# Patient Record
Sex: FEMALE | Race: BLACK OR AFRICAN AMERICAN | NOT HISPANIC OR LATINO | Employment: PART TIME | URBAN - METROPOLITAN AREA
[De-identification: names, ages, dates, MRNs, and addresses within clinical notes are randomized per-mention and may not be internally consistent; named-entity substitution may affect disease eponyms.]

---
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
## 2021-01-22 ENCOUNTER — HISTORICAL (OUTPATIENT)
Dept: RADIOLOGY | Facility: HOSPITAL | Age: 21
End: 2021-01-22

## 2021-01-26 ENCOUNTER — HISTORICAL (OUTPATIENT)
Dept: FAMILY MEDICINE | Facility: CLINIC | Age: 21
End: 2021-01-26

## 2021-01-26 LAB
CORTIS SERPL-SCNC: <1 UG/DL
PROLACTIN LEVEL (OHS): 11.67 NG/ML (ref 5.18–26.53)
T4 FREE SERPL-MCNC: 0.6 NG/DL (ref 0.7–1.48)
TSH SERPL-ACNC: 1.65 UIU/ML (ref 0.35–4.94)

## 2021-05-18 LAB — POC BETA-HCG (QUAL): NEGATIVE

## 2021-06-07 ENCOUNTER — HISTORICAL (OUTPATIENT)
Dept: RADIOLOGY | Facility: HOSPITAL | Age: 21
End: 2021-06-07

## 2021-08-02 ENCOUNTER — HISTORICAL (OUTPATIENT)
Dept: ADMINISTRATIVE | Facility: HOSPITAL | Age: 21
End: 2021-08-02

## 2021-08-02 LAB
PROLACTIN LEVEL (OHS): 7.33 NG/ML (ref 5.18–26.53)
T4 FREE SERPL-MCNC: 0.86 NG/DL (ref 0.7–1.48)
TSH SERPL-ACNC: 0.07 UIU/ML (ref 0.35–4.94)

## 2022-03-28 ENCOUNTER — HISTORICAL (OUTPATIENT)
Dept: ADMINISTRATIVE | Facility: HOSPITAL | Age: 22
End: 2022-03-28

## 2022-03-28 ENCOUNTER — HISTORICAL (OUTPATIENT)
Dept: RADIOLOGY | Facility: HOSPITAL | Age: 22
End: 2022-03-28

## 2022-05-02 NOTE — HISTORICAL [facility] CERNER
This is a historical note converted from Elo. Formatting and pictures may have been removed.  Please reference Elo for original formatting and attached multimedia. Chief Complaint  Follow up Thyroid  History of Present Illness  20yo F presents to the [facility] for a routine visit.  ?  Acute Issues: Patient complaining of decreased appetite. Has lost 6lbs since 07/12/2021. Some times does not eat the whole day. Has been going on for about a week. Denies any nausea, vomiting, constipation, headaches, heat/cold intolerance or palpitations. Mother says she will eat if her mother cooks food that she likes.  ?  Chronic Conditions:  Hypopituitarism/Hx of Prolactinoma: s/p prolactinoma resection (01/2020)?Has been taking medication, but was taking incorrectly. Mother has begun helping her take the meds on time. Follows with Endocrinology and neurosurgery in [place], has appointment with Endocrinology at the beginning of September.  Primary Amenorrhea: managed by GYN clinic. compliant with OCP. has been advised that she may need to seek [facility] physician in future for possible fertility planning.  ?  Health Maintenance:  Immunizations: up to date  Review of Systems  Constitutional:?decreased appetite, + unintentional weight loss  HEENT: no headaches, no vision changes  Resp: No SOB, no cough, no difficulty breathing  CVS: No Chest pain, No palpitations, no edema  GI: No nausea, no vomiting, no constipation, no abdominal pain  ENT: no heat intolerance  Neuro: No syncope, no dizziness, no paraesthesias  Psych: No mood changes  Physical Exam  Vitals & Measurements  T:?37.3? ?C (Oral)? HR:?91(Peripheral)? RR:?19? BP:?107/71? SpO2:?100%?  HT:?168.00?cm? WT:?79.500?kg? BMI:?28.17?  General: Pleasant, comfortably sitting on exam table, non depressed mood, no acute distress  NECK: no LAD, no thyromegaly, no thyroid nodules palpated  CV: Regular rate rhythm,?no edema, 2+ peripheral pulses  Resp: Non-labored breathing, symmetrical  chest expansion bilaterally  Abd: soft, non-distended, non-tender to palpation, +BS  Assessment/Plan  1.?Decreased appetite?R63.0  -Possibly due to medications/hormone imbalance  -Will check TSH, Free T4, prolactin levels  Ordered:  Clinic Follow up, *Est. 09/17/21 14:40:00 CDT, Order for future visit, Primary amenorrhea, [facility]  ?  2.?Prolactinoma?D35.2  -Will check prolactin levels  -Keep appt with endocrinologist  -Continue with hydrocortisone and desmopressin as prescribed, refilled today  Ordered:  Clinic Follow up, *Est. 09/17/21 14:40:00 CDT, Order for future visit, Primary amenorrhea, [facility]  ?  3.?Hypothyroid?E03.9  -Continue Levothyroxine at current dose, refilled today  -Will consider medication adjustment following lab results and endocrinology follow up  Ordered:  Clinic Follow up, *Est. 09/17/21 14:40:00 CDT, Order for future visit, Primary amenorrhea, [facility]  ?  4.?Primary amenorrhea?N91.0  -Continue with OCPs  -Will provide info on [facility] specialist who take her insurance for future fertility talks  Ordered:  Clinic Follow up, *Est. 09/17/21 14:40:00 CDT, Order for future visit, Primary amenorrhea, [facility]  ?  Referrals  Clinic Follow up, *Est. 09/17/21 14:40:00 CDT, Order for future visit, Primary amenorrhea, [facility]   Problem List/Past Medical History  Ongoing  Hypothyroid  Primary amenorrhea  Prolactinoma  Historical  Morbid obesity  Procedure/Surgical History  Pituitary tumor  T&A/ PT TUBES   Medications  Colace 100 mg oral capsule, 100 mg= 1 cap(s), Oral, BID, PRN  desmopressin 0.1 mg oral tablet, 0.1 mg= 1 tab(s), Oral, BID  hydrocortisone 5 mg oral tablet, 15 mg= 3 tab(s), Oral, Daily  levothyroxine 125 mcg (0.125 mg) oral tablet, 125 mcg= 1 tab(s), Oral, qAM, 2 refills  Sprintec 0.25 mg-35 mcg oral tablet, 1 tab(s), Oral, Daily, 11 refills  Immunizations  Vaccine Date Status   meningococcal group B vaccine 12/16/2019 Given    hepatitis A pediatric vaccine 12/16/2019 Given   influenza virus vaccine, inactivated 11/15/2019 Recorded   influenza virus vaccine, inactivated 01/03/2019 Recorded   hepatitis A pediatric vaccine 06/07/2018 Given   influenza virus vaccine, inactivated 10/02/2017 Recorded   meningococcal conjugate vaccine 02/21/2017 Recorded   influenza virus vaccine, inactivated 10/20/2016 Recorded   influenza virus vaccine, live, trivalent 12/01/2015 Recorded   influenza virus vaccine, live, trivalent 10/10/2013 Recorded   tetanus/diphtheria/pertussis, acel(Tdap) 02/28/2012 Recorded   meningococcal conjugate vaccine 02/28/2012 Recorded   influenza virus vaccine, inactivated 01/20/2011 Recorded   varicella virus vaccine 07/23/2009 Recorded   influenza virus vaccine, inactivated 10/16/2006 Recorded   measles/mumps/rubella virus vaccine 03/21/2005 Recorded   diphtheria/pertussis, acel/tetanus ped 03/21/2005 Recorded   diphtheria/pertussis, acel/tetanus ped 10/31/2002 Recorded   varicella virus vaccine 04/29/2002 Recorded   measles/mumps/rubella virus vaccine 01/28/2002 Recorded   haemophilus b-hepatitis B vaccine 01/28/2002 Recorded   influenza virus vaccine, inactivated 2001 Recorded   poliovirus vaccine, inactivated 2001 Recorded   diphtheria/pertussis, acel/tetanus ped 2001 Recorded   pneumococcal 7-valent vaccine 2001 Recorded   hepatitis B pediatric vaccine 2001 Recorded   pneumococcal 7-valent vaccine 2001 Recorded   hepatitis B pediatric vaccine 2001 Recorded       Discussed with resident at time of visit.  Hx. of resected prolactinoma.  Mild anorexia.  Follow-up ordered studies.?  ?

## 2022-05-02 NOTE — HISTORICAL [facility] CERNER
This is a historical note converted from Cerodessa. Formatting and pictures may have been removed.  Please reference Elo for original formatting and attached multimedia. Chief Complaint  1 month F/U  History of Present Illness  Patient is a 20 yo G0 with hx of prolactinoma and hypothyroidism who presents for?follow up of her amenorrhea. She was started on OCPs 2/2019. Reports she had monthly menses until 5/2019 when endocrinologist discontinued OCPs due to concern for effect on her other medications. Her prolactin/thyroid control have been optimized and she has remained amenorrheic. Her workup was previously notable for undetectable estradiol level?with low normal LH and FSH levels. Pelvic US was also obtained and unremarkable. She is not yet sexually active and has no issues with pelvic pain.  ?   Of note, patients mother states she did not start periods until age 18 as did patients sister. She adds that her own mother did not start until age 19.  Review of Systems  Cardiovascular: Normal; Negative for irregular heartbeat, heart murmurs, palpitations.  Pulmonary: Normal; Negative for cough, sputum, shortness of breath, wheezing, asthma, or emphysema.  GI: Negative for pain, vomiting, heartburn, peptic ulcer disease, change in stool.  : Negative except as stated in HPI  Skin: Normal; Negative for rashes, keratoses, skin cancers, or acne.  MSK: Normal; Negative for joint pain, joint swelling, arthritis, joint deformity, problems with ambulation, or injuries.  Neuro: Normal; Negative for seizures, stroke, AMS or dizziness.  Endocrine: +hypothyroidism, +prolactinoma.  Vascular: Normal; Negative for varicose veins, blood clots, atherosclerosis, or leg ulcers.  Physical Exam  Vitals & Measurements  T:?37.2? ?C (Oral)? HR:?87(Peripheral)? RR:?20? BP:?99/61? SpO2:?100%?  HT:?170?cm? WT:?58.6?kg? BMI:?20.28?  General: NAD, A/Ox3. Well appearing.  Respiratory: CTAB, no wheezes, rales, or rhonchi  Cardiovascular: RRR no  murmurs, rubs, or gallops  Abdomen: soft, nondistended, nontender to palpation.  Extremities: no edema, no calf tenderness  Assessment/Plan  1.?Amenorrhea?N91.2  - Recommended discussing resuming OCPs with endocrinologist.  - Discussed potential benefit for establishing care with reproductive endocrinologist. Discussed concern for hypothalamic hypogonadism and potential implications for reproductive goals with patient and her mother.  - Pelvic US without abnormalities 5/2018  - Repeat FSH/LH/Estradiol today  - Counseled regarding importance of balanced diet and regular weight bearing exercise.?Recommended womens multivitamin.  ?  RTC 6 months  Ordered:  Clinic Follow up, *Est. 02/26/20 3:00:00 CST, Order for future visit, Amenorrhea, [facility]  Estradiol-[facility] [id number], Routine collect, 08/26/19 10:12:00 CDT, Blood, Order for future visit, Stop date 08/26/19 10:12:00 CDT, Lab Collect, Amenorrhea, 08/26/19 10:12:00 CDT  Follicle Stimulating Hormone Level, Routine collect, 08/26/19 10:10:00 CDT, Blood, Order for future visit, Stop date 08/26/19 10:10:00 CDT, Lab Collect, Amenorrhea, 08/26/19 10:10:00 CDT  Luteinizing Hormone Level, Routine collect, 08/26/19 10:10:00 CDT, Blood, Order for future visit, Stop date 08/26/19 10:10:00 CDT, Lab Collect, Amenorrhea, 08/26/19 10:10:00 CDT  ?   Problem List/Past Medical History  Ongoing  Hypothyroid  Primary amenorrhea  Prolactinoma  Historical  Morbid obesity  Procedure/Surgical History  T&A/ PT TUBES   Medications  BROMOCRIPTIN TAB 2.5MG, 2.5 mg= 1 tab(s), Oral, Daily  ibuprofen 400 mg oral tablet, 400 mg= 1 tab(s), Oral, q6hr, PRN  LEVOTHYROXIN TAB 75MCG, 75 mcg= 1 tab(s), Oral, Daily  Sprintec 0.25 mg-35 mcg oral tablet, 1 tab(s), Oral, Daily, 11 refills  Allergies  No Known Allergies  Lab Results  Test Name Test Result Date/Time Comments   Prolactin 0.3 ng/mL (Low) 08/06/2018 14:53 CDT    T4 Free 1.08 ng/dL 06/18/2019 09:58 CDT    TSH 0.069 mIU/L (Low) 06/18/2019  09:58 CDT    Chlamydia trach-LC Negative 07/02/2019 09:30 CDT    N gonorrhoeae MARCELINO-LC Negative 07/02/2019 09:30 CDT Performed At: [facility]  [address], [place] [id number]  Jony Portillo MD Ph:[phone]   LH 0.4  FSH 1.9  Estradiol <.5  Diagnostic Results  (05/08/2018 11:53 CDT US Pelvic Non-Obsetrics)  FINDINGS: Sonographic evaluation of the pelvis was performed  transabdominally only. Imaging is limited by incomplete filling of the  urinary bladder providing an acoustic window.  ?  The uterus measures 3.8 x 2.0 x 3.0 cm. The endometrial stripe is  within normal limits of thickness measuring 5 mm. The left ovary  appears sonographically unremarkable with blood flow detected on color  flow Doppler imaging. The right ovary was not visualized and is likely  obscured by bowel gas. No free pelvic fluid or abnormal adnexal masses  are visible.  ?  IMPRESSION:  1. The uterus is small measuring 3.8 cm in length.  2. No free fluid or adnexal abnormalities are appreciated although the  right ovary was not visualized.  ?  Signature Line  Electronically Signed By: Quintin Salas MD.  Date/Time Signed: 05/08/2018 12:00 [1]     [1]?US Pelvic Non-Obsetrics; Quintin Salas MD. 05/08/2018 11:53 CDT   I asked for her endocrinologist to be sent this note for communication.? I suspect they stopped OCPs to determine if she is hypothal/hypopit. amenorrhea, as these can mask this issue.? Her FSH level was low in the past, and she still has yet to resume menses off of OCPs.?  At this point, I recommend that she see a Reproductive Endocrinologist or can continue with her Endocrinologist to manage IF this is hypothal/hypopituitarism.? [facility] was recommended as she will need fertility counseling at some point with a future plan for when she does desire childbearing.  We will remain her Gyn, however I strongly would advise we get to bottom of her condition to better  her.? OCPs will  potentially mask the underlying issue, and we can hold these until final diagnosis is made.

## 2022-05-02 NOTE — HISTORICAL [facility] CERNER
This is a historical note converted from Elo. Formatting and pictures may have been removed.  Please reference Elo for original formatting and attached multimedia. Chief Complaint  amenorrhea f/u 6 months  History of Present Illness  19 yo G0 history?of prolactinoma and hypothyroidism who presents for?follow up?primary?amenorrhea.  ?  She was started on OCPs 2/2019. Reports she had monthly menses until 5/2019 when endocrinologist discontinued OCPs due to concern for effect on her other medications.  Her workup was previously notable for undetectable estradiol level?with low normal LH and FSH levels. Pelvic US was also obtained 5/2018?and unremarkable. She is not yet sexually active and has no issues with pelvic pain.  She is now s/p transsphenoidal?hypophysectomy?this January.?She reports she remains amenorrheic. She denies pelvic pain, abnormal discharge.  Gynecological History  Last Menstrual Period: 05/15/19  Menstrual Status Intake: Premenarcheal  Of note, patients mother states she did not start periods until age 18 as did patients sister. She adds that her own mother did not start until age 19.  Not sexually active  Review of Systems  Cardiovascular: Normal; Negative for irregular heartbeat, heart murmurs, palpitations.  Pulmonary: Normal; Negative for cough, sputum, shortness of breath, wheezing, asthma, or emphysema.  GI: Negative for pain, vomiting, heartburn, peptic ulcer disease, change in stool.  : Negative except as stated in HPI  Skin: Normal; Negative for rashes, keratoses, skin cancers, or acne.  MSK: Normal; Negative for joint pain, joint swelling, arthritis, joint deformity, problems with ambulation, or injuries.  Neuro: Normal; Negative for seizures, stroke, AMS or dizziness.  Endocrine: +hypothyroidism, +prolactinoma.  Vascular: Normal; Negative for varicose veins, blood clots, atherosclerosis, or leg ulcers.  Physical Exam  Vitals & Measurements  T:?36.7? ?C (Oral)? HR:?62(Peripheral)?  RR:?18? BP:?100/66? SpO2:?100%?  HT:?169?cm? WT:?64.8?kg? BMI:?22.69?  General:?A&O, NAD, VSSAF  Eye: PERRLA, EOMI, clear conjunctiva, eyelids normal  Respiratory:?CTAB  Cardiovascular:?RRR  Gastrointestinal:?soft, non-tender, non-distended  Genitourinary: no CVA tenderness to palpation  Pelvic Exam:??Deferred  ?  Assessment/Plan  1.?Primary amenorrhea?N91.0  21 yo G0 with Primary amenorrhea secondary to hypothalamic hypogonadism with h/o hypothyroidism and ?prolactinoma s/p resection  ?  Previously?recommenced OCP for add back therapy which has been held for the past several months per her endocrinologist recommendation while on Bromocriptine which she is no longer taking. We discussed recommendation again for OCPs and her mother contacted Dr. Álvarez who she states is okay with her resuming OCP therapy. Rx provided.  ?  Recommended daily multivitamin with calcium and vitamin D as well as weight bearing exercise.  ?  Rediscussed concern for hypothalamic hypogonadism and potential implications for reproductive goals with patient and her mother. We discussed potential ramifications on her future fertility and recommendation to establish care with reproductive endocrinology when the time comes.  ?   Repeat FSH/LH/Estradiol today  Ordered:  Clinic Follow up, *Est. 05/10/20 3:00:00 CDT, Order for future visit, Primary amenorrhea  Estradiol [facility] [id number], Routine collect, 02/10/20 12:00:00 CST, Blood, Stop date 02/10/20 12:02:00 CST, Lab Collect, [facility], Primary amenorrhea, 02/10/20 12:00:00 CST  Follicle Stimulating Hormone Level, Routine collect, *Est. 02/10/20 3:00:00 CST, Blood, Order for future visit, *Est. Stop date 02/10/20 3:00:00 CST, Lab Collect, Primary amenorrhea, 02/10/20 11:57:00 CST  Luteinizing Hormone Level, Routine collect, 02/10/20 11:58:00 CST, Blood, Order for future visit, Stop date 02/10/20 11:58:00 CST, Lab Collect, Primary amenorrhea, 02/10/20 11:58:00  CST  ?  2.?Prolactinoma?D35.2  S/p?transsphenoidal?hypophysectomy? Neurosurgery @ [facility] in January. Records Requested.  She reports she has been doing well postop and her?bromocriptine therapy is discontinued.  Ordered:  Prolactin, Routine collect, 02/10/20 11:57:00 CST, Blood, Order for future visit, Stop date 02/10/20 11:57:00 CST, Lab Collect, Prolactinoma, 02/10/20 11:57:00 CST  ?  3.?Hypothyroid?E03.9  Currently on Synthroid 100 mcg Daily  ?  Follow up with Endocrinology scheduled 20.  ?  ?  RTC in 3 months  ?  Discussed with Dr. Nunez,  ?  Moncho Gonzalez MD PGY2?  [facility] [place] [facility]?  ?  Ordered:  Free T4, Routine collect, 02/10/20 11:58:00 CST, Blood, Order for future visit, Stop date 02/10/20 11:58:00 CST, Lab Collect, Hypothyroid, 02/10/20 11:58:00 CST  Thyroid Stimulating Hormone, Routine collect, *Est. 02/10/20 3:00:00 CST, Blood, Order for future visit, *Est. Stop date 02/10/20 3:00:00 CST, Lab Collect, Hypothyroid, 02/10/20 11:57:00 CST  ?  Orders:  ethinyl estradiol-norgestimate, 1 tab(s), Oral, Daily, # 28 tab(s), 11 Refill(s), Pharmacy: [id number], 169, cm, Height/Length Dosing, 02/10/20 11:08:00 CST, 64.8, kg, Weight Dosing, 02/10/20 11:08:00 CST   OB History  Pregnancy History???(0,0,0,0)?? ??  ?  ??  No previous pregnancies history have been recorded  Problem List/Past Medical History  Ongoing  Hypothyroid  Primary amenorrhea  Prolactinoma  Procedure/Surgical History  Pituitary tumor  T&A/ PT TUBES   Medications  acetaminophen-hydrocodone 325 mg-5 mg oral tablet, 1 tab(s), Oral, q8hr  desmopressin 0.1 mg oral tablet, 0.1 mg= 1 tab(s), Oral, BID  hydrocortisone 5 mg oral tablet, 15 mg= 3 tab(s), Oral, Daily  ibuprofen 400 mg oral tablet, 400 mg= 1 tab(s), Oral, q6hr, PRN,? ?Not taking  levothyroxine 100 mcg (0.1 mg) oral tablet, 100 mcg= 1 tab(s), Oral, Daily  Pantoprazole 40 mg ORAL EC-Tablet, 40 mg= 1 tab(s), Oral, Daily  Sprintec 0.25 mg-35 mcg oral  tablet, 1 tab(s), Oral, Daily, 11 refills  Allergies  No Known Allergies  Social History  Abuse/Neglect  No, 02/10/2020  Alcohol  Never, 02/10/2020  Employment/School  Student, 02/10/2020  Exercise  Exercise duration: 0., 02/10/2020  Home/Environment  Lives with Mother., 02/10/2020  Nutrition/Health  Regular, 02/10/2020  Sexual  Sexually active: No. Sexual orientation: Straight or heterosexual. Gender Identity Identifies as female. No, 02/10/2020  Spiritual/Cultural  Oriental orthodox, No, 12/16/2019  Substance Use  Never, 02/10/2020  Tobacco  Never (less than 100 in lifetime), N/A, 02/10/2020  Marital Status  Single  Lab Results  Test Name Test Result Date/Time Comments   FSH 0.5 mIU/mL 08/26/2019 10:52 CDT Follicle Stimulating Hormone reference range: Female, 11 years and older  ?  mIU/mL  Prepubertal Child 1.0 ? - 8.0  Follicular Phase 1.5 ? - 12.0  Mid Cycle 5.0 ? - 22.0  Luteal Phase 1.0 ? - 10.0  Postmenopausal 25.0 - 110.0   Prolactin 0.3 ng/mL (Low) 08/06/2018 14:53 CDT    LH <0.2 mIU/mL 08/26/2019 10:52 CDT Luteinizing Hormone reference range: Female, 11 years and older  ?  mIU/mL  Prepubertal Child 1.0 ? - 6.0  Follicular Phase 1.0 ? - 18.0  Mid Cycle 24.0 - 105.0  Luteal Phase 0.4 ? - 20.0  Postmenopausal 15.0 - 62.0  ?  Luteinizing Hormone reference range: Male  ?  mIU/mL  < = 2 years 0.5 ? - 1.9  2 - 1l years ? ? ? < 0.5  11 years and up 2.0 ? - 12.0   Estradiol-LC <5.0 pg/mL 08/26/2019 10:52 CDT ? ? ? ? ? ? ? ? ? ?Adult Female:  ? ? ? ? ? ? ? ? ? ? ?Follicular phase ? 12.5 - ? 166.0  ? ? ? ? ? ? ? ? ? ? ?Ovulation phase ? ?85.8 - ? 498.0  ? ? ? ? ? ? ? ? ? ? ?Luteal phase ? ? ? 43.8 - ? 211.0  ? ? ? ? ? ? ? ? ? ? ?Postmenopausal ? ? <6.0 - ? ?54.7  ? ? ? ? ? ? ? ? ? ?Pregnancy  ? ? ? ? ? ? ? ? ? ? ?1st trimester ? ? 215.0 - >4300.0  ? ? ? ? ? ? ? ? ? ?Girls (1-10 years) ? ?6.0 - ? ?27.0  [place] ECLIA methodology  Performed At: _[facility]  [address] [place] [id number]  Damon Pantoja MD  :[id number]   T4 Free 1.08 ng/dL 06/18/2019 09:58 CDT    TSH 0.069 mIU/L (Low) 06/18/2019 09:58 CDT    Diagnostic Results  - Pelvic US without abnormalities 5/2018      I kept this message/note for sometime due to labs and needing to follow up with patient.? I personally staffed this visit on 2/10/2020, date of visit.? She is HYPOTHALAMIC/HYPOPITUITARY failure and will need to be maintained on estrogen/progesterone to replace normal levels of these hormones.  I would like to ensure she understands this.  I have called her today (3/23/2020) via telephone and advised her to call us back at her convenience.  ?

## 2022-07-01 ENCOUNTER — HOSPITAL ENCOUNTER (EMERGENCY)
Facility: HOSPITAL | Age: 22
Discharge: HOME | End: 2022-07-01
Attending: FAMILY MEDICINE
Payer: MEDICAID

## 2022-07-01 VITALS
DIASTOLIC BLOOD PRESSURE: 60 MMHG | HEART RATE: 100 BPM | HEIGHT: 66 IN | OXYGEN SATURATION: 99 % | WEIGHT: 155.44 LBS | BODY MASS INDEX: 24.98 KG/M2 | SYSTOLIC BLOOD PRESSURE: 94 MMHG | RESPIRATION RATE: 16 BRPM | TEMPERATURE: 99 F

## 2022-07-01 DIAGNOSIS — J32.9 SINUSITIS, UNSPECIFIED CHRONICITY, UNSPECIFIED LOCATION: Primary | ICD-10-CM

## 2022-07-01 DIAGNOSIS — J02.9 PHARYNGITIS, UNSPECIFIED ETIOLOGY: ICD-10-CM

## 2022-07-01 LAB
FLUAV AG UPPER RESP QL IA.RAPID: NOT DETECTED
FLUBV AG UPPER RESP QL IA.RAPID: NOT DETECTED
SARS-COV-2 RNA RESP QL NAA+PROBE: NOT DETECTED
STREP A PCR (OHS): NOT DETECTED

## 2022-07-01 PROCEDURE — 87631 RESP VIRUS 3-5 TARGETS: CPT | Performed by: NURSE PRACTITIONER

## 2022-07-01 PROCEDURE — 99284 EMERGENCY DEPT VISIT MOD MDM: CPT | Mod: 25

## 2022-07-01 PROCEDURE — 87636 SARSCOV2 & INF A&B AMP PRB: CPT | Mod: 59 | Performed by: NURSE PRACTITIONER

## 2022-07-01 RX ORDER — AMOXICILLIN AND CLAVULANATE POTASSIUM 875; 125 MG/1; MG/1
1 TABLET, FILM COATED ORAL 2 TIMES DAILY
Qty: 14 TABLET | Refills: 0 | Status: SHIPPED | OUTPATIENT
Start: 2022-07-01 | End: 2023-02-02

## 2022-07-01 RX ORDER — CETIRIZINE HYDROCHLORIDE 10 MG/1
10 TABLET ORAL DAILY
Qty: 14 TABLET | Refills: 0 | Status: SHIPPED | OUTPATIENT
Start: 2022-07-01 | End: 2023-04-25 | Stop reason: SDUPTHER

## 2022-07-01 RX ORDER — FLUTICASONE PROPIONATE 50 MCG
1 SPRAY, SUSPENSION (ML) NASAL 2 TIMES DAILY PRN
Qty: 15 G | Refills: 0 | Status: SHIPPED | OUTPATIENT
Start: 2022-07-01 | End: 2023-04-25 | Stop reason: SDUPTHER

## 2022-07-01 NOTE — DISCHARGE INSTRUCTIONS
Increase fluid intake, clear liquids x 12 hours. Advance diet slowly.  Take tylenol or motrin every 6-8 hours for fever of 101 or greater.  Follow up with your primary care physician in 3-5 days for follow up evaluation.  Gargle with warm salt water and spit 3 times daily.

## 2022-07-01 NOTE — Clinical Note
"Mely"Zoila Hardy was seen and treated in our emergency department on 7/1/2022.     COVID-19 is present in our communities across the state. There is limited testing for COVID at this time, so not all patients can be tested. In this situation, your employee meets the following criteria:    Mely Hardy has met the criteria for COVID-19 testing and has a NEGATIVE result. The employee can return to work once they are asymptomatic for 24 hours without the use of fever reducing medications (Tylenol, Motrin, etc).     If the employee is not fully vaccinated and had a close contact:  · Retest at 5 to 7 days post-exposure  · If possible, it is recommended that they quarantine for 5 days from the time of contact regardless of their test status.  · A mask should be worn post quarantine for 5 days.  If you have any questions or concerns, or if I can be of further assistance, please do not hesitate to contact me.    Sincerely,              DEVAN"

## 2022-07-01 NOTE — ED PROVIDER NOTES
Encounter Date: 7/1/2022       History     Chief Complaint   Patient presents with    Nasal Congestion    Sore Throat     Sore throat/nasal congestion x2 days.     Pt is a 22 y.o. female who presents to the [facility] ED complaining of sore throat and sinus congestion. Symptoms x 2 days. Denies chest pain, SOB, weakness, dizziness, fever, abdominal pain, or loss of bowel or bladder control.         Review of patient's allergies indicates:  No Known Allergies  Past Medical History:   Diagnosis Date    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     Past Surgical History:   Procedure Laterality Date    ADENOIDECTOMY      SURGICAL REMOVAL OF PITUITARY TUMOR BY TRANSSPHENOIDAL APPROACH N/A 1/3/2020    Procedure: RESECTION, NEOPLASM, PITUITARY, TRANSSPHENOIDAL APPROACH ETSR /c navigation in joint procedure /c ENT;  Surgeon: Dino Llamas MD;  Location: hospitals MAIN OR;  Service: ENT;  Laterality: N/A;    TONSILLECTOMY      TONSILLECTOMY AND ADENOIDECTOMY      TYMPANOSTOMY TUBE PLACEMENT       Family History   Problem Relation Age of Onset    No Known Problems Mother     No Known Problems Father     No Known Problems Sister     No Known Problems Brother     No Known Problems Maternal Aunt     No Known Problems Maternal Uncle     No Known Problems Paternal Aunt     No Known Problems Paternal Uncle     Cancer Maternal Grandmother     Hypertension Maternal Grandmother     No Known Problems Maternal Grandfather     No Known Problems Paternal Grandmother     No Known Problems Paternal Grandfather     Aneurysm Neg Hx     Clotting disorder Neg Hx     Dementia Neg Hx     Diabetes Neg Hx     Fainting Neg Hx     Heart disease Neg Hx     Hyperlipidemia Neg Hx     Kidney disease Neg Hx     Liver disease Neg Hx     Migraines Neg Hx     Neuropathy Neg Hx     Obesity Neg Hx     Parkinsonism Neg Hx     Seizures Neg Hx     Stroke Neg Hx     Tremor Neg Hx      Social History     Tobacco Use    Smoking status: Never Smoker     Smokeless tobacco: Never Used   Substance Use Topics    Alcohol use: Never    Drug use: Never     Review of Systems   Constitutional: Negative for chills, diaphoresis, fatigue and fever.   HENT: Positive for rhinorrhea, sinus pain and sore throat. Negative for facial swelling, sinus pressure and trouble swallowing.    Respiratory: Negative for cough, chest tightness, shortness of breath and wheezing.    Cardiovascular: Negative for chest pain, palpitations and leg swelling.   Gastrointestinal: Negative for abdominal pain, diarrhea, nausea and vomiting.   Genitourinary: Negative for dysuria, flank pain, frequency, hematuria and urgency.   Musculoskeletal: Negative for arthralgias, back pain, joint swelling and myalgias.   Skin: Negative for color change and rash.   Neurological: Negative for dizziness, syncope, weakness and light-headedness.   Hematological: Does not bruise/bleed easily.   All other systems reviewed and are negative.      Physical Exam     Initial Vitals [07/01/22 1334]   BP Pulse Resp Temp SpO2   94/60 100 16 98.5 °F (36.9 °C) 99 %      MAP       --         Physical Exam    Nursing note and vitals reviewed.  Constitutional: She appears well-developed and well-nourished.   HENT:   Head: Normocephalic and atraumatic.   Nose: Mucosal edema and rhinorrhea present. Right sinus exhibits maxillary sinus tenderness. Left sinus exhibits maxillary sinus tenderness.   Mouth/Throat: Posterior oropharyngeal edema and posterior oropharyngeal erythema present.   Erythema to bilateral nares.      Eyes: Conjunctivae and EOM are normal. Pupils are equal, round, and reactive to light.   Neck: Neck supple.   Normal range of motion.  Cardiovascular: Normal rate, regular rhythm, normal heart sounds and intact distal pulses.   Pulmonary/Chest: Effort normal and breath sounds normal. No respiratory distress. She has no wheezes. She has no rhonchi. She has no rales. She exhibits no tenderness.   Abdominal: Abdomen is soft  and flat. Bowel sounds are normal. She exhibits no distension. There is no abdominal tenderness. There is no rebound, no guarding, no tenderness at McBurney's point and negative Caruso's sign. negative psoas sign  Musculoskeletal:         General: Normal range of motion.      Cervical back: Normal range of motion and neck supple.     Neurological: She is alert and oriented to person, place, and time. She has normal strength and normal reflexes.   Skin: Skin is warm and dry. Capillary refill takes less than 2 seconds.   Psychiatric: She has a normal mood and affect. Her speech is normal and behavior is normal. Judgment and thought content normal.         ED Course   Procedures  Labs Reviewed   STREP GROUP A BY PCR - Normal   COVID/FLU A&B PCR - Normal          Imaging Results    None          Medications - No data to display  Medical Decision Making:   History:   Old Medical Records: I decided to obtain old medical records.  Differential Diagnosis:   Strep pharyngitis  COVID  Influenza  URI  Sinusitis  Clinical Tests:   Lab Tests: Ordered and Reviewed  ED Management:  3:12 PM Reassessed patient at this time. Reports condition has improved. Discussed with patient all pertinent ED information and results. Discussed diagnosis and treatment plan with patient. Follow up instructions and return to ED instruction have been given. All questions and concerns were addressed at this time. Patient voices understanding of information and instructions. Patient is comfortable with plan and discharge. Patient is stable for discharge.                         Clinical Impression:   Final diagnoses:  [J32.9] Sinusitis, unspecified chronicity, unspecified location (Primary)  [J02.9] Pharyngitis, unspecified etiology          ED Disposition Condition    Discharge Stable        ED Prescriptions     Medication Sig Dispense Start Date End Date Auth. Provider    amoxicillin-clavulanate 875-125mg (AUGMENTIN) 875-125 mg per tablet Take 1  tablet by mouth 2 (two) times daily. 14 tablet 7/1/2022  Antony Bolanos Jr., IMLEY    cetirizine (ZYRTEC) 10 MG tablet Take 1 tablet (10 mg total) by mouth once daily. for 14 days 14 tablet 7/1/2022 7/15/2022 Antony Bolanos Jr., MILEY    fluticasone propionate (FLONASE) 50 mcg/actuation nasal spray 1 spray (50 mcg total) by Each Nostril route 2 (two) times daily as needed for Rhinitis. 15 g 7/1/2022  Antony Bolanos Jr., MILEY        Follow-up Information     Follow up With Specialties Details Why Contact Info    [facility] Emergency Dept Emergency Medicine In 3 days As needed, If symptoms worsen [address] [facility] [address] [phone]           MILEY Crabtree Jr.  07/01/22 7063

## 2022-07-02 PROBLEM — E23.0: Status: ACTIVE | Noted: 2022-07-02

## 2022-08-22 PROCEDURE — 99284 EMERGENCY DEPT VISIT MOD MDM: CPT | Mod: 25

## 2022-08-23 ENCOUNTER — HOSPITAL ENCOUNTER (EMERGENCY)
Facility: HOSPITAL | Age: 22
Discharge: HOME | End: 2022-08-23
Attending: EMERGENCY MEDICINE
Payer: MEDICAID

## 2022-08-23 VITALS
WEIGHT: 155.75 LBS | RESPIRATION RATE: 18 BRPM | OXYGEN SATURATION: 97 % | BODY MASS INDEX: 24.45 KG/M2 | SYSTOLIC BLOOD PRESSURE: 102 MMHG | HEIGHT: 67 IN | HEART RATE: 62 BPM | TEMPERATURE: 99 F | DIASTOLIC BLOOD PRESSURE: 68 MMHG

## 2022-08-23 DIAGNOSIS — R10.30 LOWER ABDOMINAL PAIN: Primary | ICD-10-CM

## 2022-08-23 LAB
ALBUMIN SERPL-MCNC: 4.1 GM/DL (ref 3.5–5)
ALBUMIN/GLOB SERPL: 1.5 RATIO (ref 1.1–2)
ALP SERPL-CCNC: 89 UNIT/L (ref 40–150)
ALT SERPL-CCNC: 13 UNIT/L (ref 0–55)
APPEARANCE UR: CLEAR
AST SERPL-CCNC: 25 UNIT/L (ref 5–34)
B-HCG UR QL: NEGATIVE
BACTERIA #/AREA URNS AUTO: ABNORMAL /HPF
BASOPHILS # BLD AUTO: 0.04 X10(3)/MCL (ref 0–0.2)
BASOPHILS NFR BLD AUTO: 0.5 %
BILIRUB UR QL STRIP.AUTO: NEGATIVE MG/DL
BILIRUBIN DIRECT+TOT PNL SERPL-MCNC: 0.4 MG/DL
BUN SERPL-MCNC: 9.7 MG/DL (ref 7–18.7)
CALCIUM SERPL-MCNC: 9.6 MG/DL (ref 8.4–10.2)
CHLORIDE SERPL-SCNC: 106 MMOL/L (ref 98–107)
CO2 SERPL-SCNC: 25 MMOL/L (ref 22–29)
COLOR UR AUTO: COLORLESS
CREAT SERPL-MCNC: 0.95 MG/DL (ref 0.55–1.02)
CTP QC/QA: YES
EOSINOPHIL # BLD AUTO: 0.31 X10(3)/MCL (ref 0–0.9)
EOSINOPHIL NFR BLD AUTO: 3.8 %
ERYTHROCYTE [DISTWIDTH] IN BLOOD BY AUTOMATED COUNT: 11.9 % (ref 11.5–17)
GFR SERPLBLD CREATININE-BSD FMLA CKD-EPI: >60 MLS/MIN/1.73/M2
GLOBULIN SER-MCNC: 2.8 GM/DL (ref 2.4–3.5)
GLUCOSE SERPL-MCNC: 64 MG/DL (ref 74–100)
GLUCOSE UR QL STRIP.AUTO: NEGATIVE MG/DL
HCT VFR BLD AUTO: 30.2 % (ref 37–47)
HGB BLD-MCNC: 10.1 GM/DL (ref 12–16)
IMM GRANULOCYTES # BLD AUTO: 0.02 X10(3)/MCL (ref 0–0.04)
IMM GRANULOCYTES NFR BLD AUTO: 0.2 %
KETONES UR QL STRIP.AUTO: NEGATIVE MG/DL
LEUKOCYTE ESTERASE UR QL STRIP.AUTO: NEGATIVE UNIT/L
LIPASE SERPL-CCNC: 22 U/L
LYMPHOCYTES # BLD AUTO: 3.91 X10(3)/MCL (ref 0.6–4.6)
LYMPHOCYTES NFR BLD AUTO: 48.2 %
MCH RBC QN AUTO: 27.6 PG (ref 27–31)
MCHC RBC AUTO-ENTMCNC: 33.4 MG/DL (ref 33–36)
MCV RBC AUTO: 82.5 FL (ref 80–94)
MONOCYTES # BLD AUTO: 0.56 X10(3)/MCL (ref 0.1–1.3)
MONOCYTES NFR BLD AUTO: 6.9 %
NEUTROPHILS # BLD AUTO: 3.3 X10(3)/MCL (ref 2.1–9.2)
NEUTROPHILS NFR BLD AUTO: 40.4 %
NITRITE UR QL STRIP.AUTO: NEGATIVE
NRBC BLD AUTO-RTO: 0 %
PH UR STRIP.AUTO: 6.5 [PH]
PLATELET # BLD AUTO: 260 X10(3)/MCL (ref 130–400)
PMV BLD AUTO: 11 FL (ref 7.4–10.4)
POTASSIUM SERPL-SCNC: 3.9 MMOL/L (ref 3.5–5.1)
PROT SERPL-MCNC: 6.9 GM/DL (ref 6.4–8.3)
PROT UR QL STRIP.AUTO: NEGATIVE MG/DL
RBC # BLD AUTO: 3.66 X10(6)/MCL (ref 4.2–5.4)
RBC #/AREA URNS AUTO: ABNORMAL /HPF
RBC UR QL AUTO: ABNORMAL UNIT/L
SARS-COV-2 RDRP RESP QL NAA+PROBE: NEGATIVE
SODIUM SERPL-SCNC: 142 MMOL/L (ref 136–145)
SP GR UR STRIP.AUTO: 1
SQUAMOUS #/AREA URNS LPF: 0 /HPF
UROBILINOGEN UR STRIP-ACNC: NORMAL MG/DL
WBC # SPEC AUTO: 8.1 X10(3)/MCL (ref 4.5–11.5)
WBC #/AREA URNS AUTO: ABNORMAL /HPF

## 2022-08-23 PROCEDURE — 36415 COLL VENOUS BLD VENIPUNCTURE: CPT | Performed by: EMERGENCY MEDICINE

## 2022-08-23 PROCEDURE — 25500020 PHARM REV CODE 255: Performed by: EMERGENCY MEDICINE

## 2022-08-23 PROCEDURE — 83690 ASSAY OF LIPASE: CPT | Performed by: EMERGENCY MEDICINE

## 2022-08-23 PROCEDURE — 96360 HYDRATION IV INFUSION INIT: CPT

## 2022-08-23 PROCEDURE — 80053 COMPREHEN METABOLIC PANEL: CPT | Performed by: EMERGENCY MEDICINE

## 2022-08-23 PROCEDURE — 87635 SARS-COV-2 COVID-19 AMP PRB: CPT | Performed by: EMERGENCY MEDICINE

## 2022-08-23 PROCEDURE — 63600175 PHARM REV CODE 636 W HCPCS: Performed by: EMERGENCY MEDICINE

## 2022-08-23 PROCEDURE — 81001 URINALYSIS AUTO W/SCOPE: CPT | Performed by: EMERGENCY MEDICINE

## 2022-08-23 PROCEDURE — 85025 COMPLETE CBC W/AUTO DIFF WBC: CPT | Performed by: EMERGENCY MEDICINE

## 2022-08-23 PROCEDURE — 81025 URINE PREGNANCY TEST: CPT | Performed by: EMERGENCY MEDICINE

## 2022-08-23 RX ORDER — SODIUM CHLORIDE, SODIUM LACTATE, POTASSIUM CHLORIDE, CALCIUM CHLORIDE 600; 310; 30; 20 MG/100ML; MG/100ML; MG/100ML; MG/100ML
1000 INJECTION, SOLUTION INTRAVENOUS
Status: COMPLETED | OUTPATIENT
Start: 2022-08-23 | End: 2022-08-23

## 2022-08-23 RX ADMIN — IOPAMIDOL 100 ML: 755 INJECTION, SOLUTION INTRAVENOUS at 02:08

## 2022-08-23 RX ADMIN — SODIUM CHLORIDE, POTASSIUM CHLORIDE, SODIUM LACTATE AND CALCIUM CHLORIDE 1000 ML: 600; 310; 30; 20 INJECTION, SOLUTION INTRAVENOUS at 03:08

## 2022-08-23 NOTE — DISCHARGE INSTRUCTIONS
As discussed by the surgery consultants, this does not appear to be appendicitis and should resolve on its own.    Return if worse or if any further concerning symptoms develop.

## 2022-08-23 NOTE — CONSULTS
\[facility]\"" General Surgery Service  ADMIT / CONSULT / H&P NOTE  Date: 8/23/2022    CC: Diffuse abdominal pain    SUBJECTIVE    HPI:   Mely Hardy is a 22 y.o. female with a Hx of ACTH deficiency, central DI, central hypothyroidism and amenorrhea 2/2 functional prolactinoma s/p resection in 2020 presenting with bilateral lower quadrant abdominal pain. 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 She describes it as an intermittent, sudden pulling/pinching pain in her lower abdomen that is an 8/10 at it's worst and a 1/10 at its best. It is aggravated by lying down and improved by sitting up in bed. She denies fevers, chills, night sweats, N/V/D, recent abdominal/pelvic trauma, SOB, CP, dysuria, increased urinary frequency. She has never had a period and has never been pregnant.    ROS:  As stated above, otherwise negative.     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 Prolactin levels remain low (about 5) in the absence of dopamine agonist treatment since the surgery.    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       PSH:   Past Surgical History:   Procedure Laterality Date    ADENOIDECTOMY      SURGICAL REMOVAL OF PITUITARY TUMOR BY TRANSSPHENOIDAL APPROACH N/A 1/3/2020    Procedure: RESECTION, NEOPLASM, PITUITARY, TRANSSPHENOIDAL APPROACH ETSR /c navigation in joint procedure /c ENT;  Surgeon: Dino Llamas MD;  Location: [facility] MAIN OR;  Service: ENT;  Laterality: N/A;    TONSILLECTOMY      TONSILLECTOMY AND ADENOIDECTOMY      TYMPANOSTOMY TUBE PLACEMENT         FamHx:   Family History   Problem Relation Age of Onset    No Known Problems Mother     No Known Problems Father     No Known Problems Sister     No Known Problems Brother     No Known Problems Maternal Aunt     No  Known Problems Maternal Uncle     No Known Problems Paternal Aunt     No Known Problems Paternal Uncle     Cancer Maternal Grandmother     Hypertension Maternal Grandmother     No Known Problems Maternal Grandfather     No Known Problems Paternal Grandmother     No Known Problems Paternal Grandfather     Aneurysm Neg Hx     Clotting disorder Neg Hx     Dementia Neg Hx     Diabetes Neg Hx     Fainting Neg Hx     Heart disease Neg Hx     Hyperlipidemia Neg Hx     Kidney disease Neg Hx     Liver disease Neg Hx     Migraines Neg Hx     Neuropathy Neg Hx     Obesity Neg Hx     Parkinsonism Neg Hx     Seizures Neg Hx     Stroke Neg Hx     Tremor Neg Hx        SocHx:  Social History     Socioeconomic History    Marital status: Single   Tobacco Use    Smoking status: Never Smoker    Smokeless tobacco: Never Used   Substance and Sexual Activity    Alcohol use: Never    Drug use: Never    Sexual activity: Not Currently       Allergies:   Review of patient's allergies indicates:  No Known Allergies    Medications:  No current facility-administered medications on file prior to encounter.     Current Outpatient Medications on File Prior to Encounter   Medication Sig Dispense Refill    fluticasone propionate (FLONASE) 50 mcg/actuation nasal spray 1 spray (50 mcg total) by Each Nostril route 2 (two) times daily as needed for Rhinitis. 15 g 0    hydrocortisone (CORTEF) 10 MG Tab Take one or two tabs daily for up to three days for illness without fever or vomiting 10 tablet 5    levothyroxine (SYNTHROID) 125 MCG tablet Take 1 tablet (125 mcg total) by mouth before breakfast. 30 tablet 11    SPRINTEC, 28, 0.25-35 mg-mcg per tablet       amoxicillin-clavulanate 875-125mg (AUGMENTIN) 875-125 mg per tablet Take 1 tablet by mouth 2 (two) times daily. 14 tablet 0    cetirizine (ZYRTEC) 10 MG tablet Take 1 tablet (10 mg total) by mouth once daily. for 14 days 14 tablet 0    desmopressin (DDAVP) 0.1 MG  "Tab TAKE 1/2 (ONE-HALF) TABLET BY MOUTH ONCE DAILY IN THE MORNING AND 1 IN THE EVENING 20 tablet 1    HYDROcodone-acetaminophen (NORCO) 5-325 mg per tablet       hydrocortisone (CORTEF) 5 MG Tab Take 1 tablet (5 mg total) by mouth 3 (three) times daily. Two in the morning and one in the afternoon. 90 tablet 5    pantoprazole (PROTONIX) 40 MG tablet Take 1 tablet (40 mg total) by mouth once daily. (Patient not taking: Reported on 5/12/2022) 90 tablet 1       OBJECTIVE    VITAL SIGNS: 24 HR MIN & MAX LAST    Temp  Min: 98.6 °F (37 °C)  Max: 98.6 °F (37 °C)  98.6 °F (37 °C)        BP  Min: 96/74  Max: 126/80  96/74     Pulse  Min: 68  Max: 84  71     Resp  Min: 18  Max: 20  18    SpO2  Min: 98 %  Max: 100 %  100 %      HT: 5' 7" (170.2 cm)  WT: 70.7 kg (155 lb 12.1 oz)  BMI: 24.4       Physical Exam:  General: NAD  HEENT: Anicteric sclera  Resp: Unlabored respirations  Cardiac: RRR  Abdomen: Soft, non-distended. Minimally tender to deep palpation.  Extremities: Well perfused    Labs  WBC - 8.1  Hgb - 10.1  Cr - 0.95  T bili - 0.4  ALT - 13  AST - 25  Alk phos - 89  Lipase - 22  Preg. Test - Negative    Imaging:  CT Abd/Pelvis with Contrast  1. The appendix is mildly thickened to 9mm (series 6 image 41) but without regional inflammation. Correlate for the possibility of early appendictis.  2. There are a few prominent lymph nodes throughout the abdominal mesentery including right lower quadrant mid abdominal mesentery. Correlate with clinical and laboratory finding as regards a possible element of mesenteric adenitis.  3. Details and other findings as discussed above.    A/P:   22 y.o. female with a Hx of a functional prolactinoma s/p resection, now on hormone replacement therapy, presenting with non-specific bilateral lower quadrant pain.  CT abd/pelvis with mildly thickened appendix w/o inflammatory changes. Pain resolved. Low suspicion for appendicitis.    - Recommend CT chest as outpatient d/t nodule noted on ED " CT scan  - No surgical interventions indicated at this time. Please call with questions.    Fabricio Richards MD  [employer] General Surgery, PGY-1

## 2022-08-23 NOTE — Clinical Note
Xuan Caro accompanied their mother to the emergency department on 8/22/2022. They may return to work on 08/24/2022.      If you have any questions or concerns, please don't hesitate to call.      Mark FARIA

## 2022-08-23 NOTE — ED NOTES
Pt given discharge instructions. Pt instructed to follow up with pcp and return to er if any complications

## 2022-08-23 NOTE — ED PROVIDER NOTES
Encounter Date: 8/22/2022       History     Chief Complaint   Patient presents with    Abdominal Pain     Also  pelvic  pain.  Patient had  her  pituitary   gland  remove  2019 .  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  Successful surgery, other associated conditions have included ACTH deficiency, central diabetes insipidus, central hypothyroidism, amenorrhea.  She has had some menses initiated by birth control pills.  She reports good compliance with desmopressin, hydrocortisone, levothyroxine, and other medications as listed.  She now is here with her mother for bilateral low abdominal and pelvic discomfort for about 3 days, seemingly worse with lying down and not associated with menstrual or urinary symptoms.  She gets a little lightheaded and has felt this way for about for 5 days.  No fever or back pain.  No other localizing complaints.  Never pregnant.    The history is provided by the patient and a parent. No  was used.     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 Prolactin levels remain low (about 5) in the absence of dopamine agonist treatment since the surgery.    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     Past Surgical History:   Procedure Laterality Date    ADENOIDECTOMY      SURGICAL REMOVAL OF PITUITARY TUMOR BY TRANSSPHENOIDAL APPROACH N/A 1/3/2020    Procedure: RESECTION, NEOPLASM, PITUITARY, TRANSSPHENOIDAL APPROACH ETSR /c navigation in joint procedure /c ENT;  Surgeon: Dino Llamas MD;  Location: [facility]  MAIN OR;  Service: ENT;  Laterality: N/A;    TONSILLECTOMY      TONSILLECTOMY AND ADENOIDECTOMY      TYMPANOSTOMY TUBE PLACEMENT       Family History   Problem Relation Age of Onset    No Known Problems Mother     No Known Problems Father     No Known Problems Sister     No Known Problems Brother     No Known Problems Maternal Aunt     No Known Problems Maternal Uncle     No Known Problems Paternal Aunt     No Known Problems Paternal Uncle     Cancer Maternal Grandmother     Hypertension Maternal Grandmother     No Known Problems Maternal Grandfather     No Known Problems Paternal Grandmother     No Known Problems Paternal Grandfather     Aneurysm Neg Hx     Clotting disorder Neg Hx     Dementia Neg Hx     Diabetes Neg Hx     Fainting Neg Hx     Heart disease Neg Hx     Hyperlipidemia Neg Hx     Kidney disease Neg Hx     Liver disease Neg Hx     Migraines Neg Hx     Neuropathy Neg Hx     Obesity Neg Hx     Parkinsonism Neg Hx     Seizures Neg Hx     Stroke Neg Hx     Tremor Neg Hx      Social History     Tobacco Use    Smoking status: Never Smoker    Smokeless tobacco: Never Used   Substance Use Topics    Alcohol use: Never    Drug use: Never     Review of Systems   Constitutional: Negative for activity change, fatigue and fever.   HENT: Negative for congestion, ear pain, facial swelling, nosebleeds, sinus pressure and sore throat.    Eyes: Negative for pain, discharge, redness and visual disturbance.   Respiratory: Negative for cough, choking, chest tightness, shortness of breath and wheezing.    Cardiovascular: Negative for chest pain, palpitations and leg swelling.   Gastrointestinal: Positive for abdominal pain. Negative for abdominal distention, nausea and vomiting.   Endocrine: Negative for heat intolerance, polydipsia and polyuria.   Genitourinary: Positive for menstrual problem and pelvic pain. Negative for difficulty urinating, dysuria, flank pain, hematuria and urgency.    Musculoskeletal: Negative for back pain, gait problem, joint swelling and myalgias.   Skin: Negative for color change and rash.   Allergic/Immunologic: Negative for environmental allergies and food allergies.   Neurological: Positive for light-headedness. Negative for dizziness, weakness, numbness and headaches.   Hematological: Negative for adenopathy. Does not bruise/bleed easily.   Psychiatric/Behavioral: Negative for agitation and behavioral problems. The patient is not nervous/anxious.    All other systems reviewed and are negative.      Physical Exam     Initial Vitals [08/22/22 2316]   BP Pulse Resp Temp SpO2   99/64 68 20 98.6 °F (37 °C) 100 %      MAP       --         Physical Exam    Nursing note and vitals reviewed.  Constitutional: She appears well-developed and well-nourished. She is not diaphoretic. No distress.   HENT:   Head: Normocephalic and atraumatic.   Mouth/Throat: No oropharyngeal exudate.   Eyes: Conjunctivae and EOM are normal. Pupils are equal, round, and reactive to light. Right eye exhibits no discharge. Left eye exhibits no discharge. No scleral icterus.   Neck: Neck supple. No thyromegaly present. No tracheal deviation present. No JVD present.   Normal range of motion.  Cardiovascular: Normal rate, regular rhythm, normal heart sounds and intact distal pulses. Exam reveals no gallop and no friction rub.    No murmur heard.  Pulmonary/Chest: Breath sounds normal. No stridor. No respiratory distress. She has no wheezes. She has no rhonchi. She has no rales. She exhibits no tenderness.   Abdominal: Abdomen is soft. Bowel sounds are normal. She exhibits no distension and no mass. There is no abdominal tenderness.   No definite findings on exam.  Indicates bilateral low abdomen and pelvic regions as areas of discomfort.  No localizing tenderness, rebound, or guarding. There is no rebound and no guarding.   Musculoskeletal:         General: No tenderness or edema. Normal range of motion.       Cervical back: Normal range of motion and neck supple.     Neurological: She is alert and oriented to person, place, and time. She has normal strength.   Skin: Skin is warm and dry. No rash and no abscess noted. No erythema.   Psychiatric: She has a normal mood and affect. Her behavior is normal. Judgment and thought content normal.         ED Course   Procedures  Labs Reviewed   COMPREHENSIVE METABOLIC PANEL - Abnormal; Notable for the following components:       Result Value    Glucose Level 64 (*)     All other components within normal limits   URINALYSIS, REFLEX TO URINE CULTURE - Abnormal; Notable for the following components:    Blood, UA Trace (*)     All other components within normal limits   CBC WITH DIFFERENTIAL - Abnormal; Notable for the following components:    RBC 3.66 (*)     Hgb 10.1 (*)     Hct 30.2 (*)     MPV 11.0 (*)     All other components within normal limits   LIPASE - Normal   SARS-COV-2 RNA AMPLIFICATION, QUAL - Normal   CBC W/ AUTO DIFFERENTIAL    Narrative:     The following orders were created for panel order CBC W/ AUTO DIFFERENTIAL.  Procedure                               Abnormality         Status                     ---------                               -----------         ------                     CBC with Differential[[id number]]        Abnormal            Final result                 Please view results for these tests on the individual orders.   POCT URINE PREGNANCY          Imaging Results          CT Abdomen Pelvis With Contrast (Final result)  Result time 08/23/22 06:27:40    Final result by Rashaad Swanson MD (08/23/22 06:27:40)                 Impression:    Impression:    1. The appendix is mildly thickened to 9mm (series 6 image 41) but without regional inflammation. Correlate for the possibility of early appendictis.    2. There are a few prominent lymph nodes throughout the abdominal mesentery including right lower quadrant mid abdominal mesentery. Correlate with  clinical and laboratory finding as regards a possible element of mesenteric adenitis.    3.  Right lower lobe  nodule identified on the most superior image measuring approximately 4 mm.  This is statistically benign in this age group, however recommend dedicated CT of the chest for full evaluation of the lungs.    Agree with overnight read with additional recommendations for lung nodule.      Electronically signed by: Rashaad Swanson  Date:    08/23/2022  Time:    06:27             Narrative:    EXAMINATION:  CT ABDOMEN PELVIS WITH CONTRAST    CLINICAL HISTORY:  Abdominal pain, acute, nonlocalized;    TECHNIQUE:  Multidetector IV contrast enhanced axial CT images of the abdomen and pelvis were obtained with coronal and sagittal reconstructions.    Automatic exposure control was utilized to reduce the patient's radiation dose.    DLP= 508    COMPARISON:  No prior imaging available for comparison.    FINDINGS:  Lines and Tubes: None.    Thorax:    Lungs: The visualized lung bases appear unremarkable. No focal infiltrate or consolidation is seen.    Pleura: No effusions or thickening are seen. No pneumothorax is seen in the visualized lung bases.    Heart: The heart size is within normal limits.    Abdomen:    Abdominal Wall: No abdominal wall pathology is seen.    Liver: The liver appears unremarkable.    Biliary System: No intrahepatic or extrahepatic biliary duct dilatation is seen.    Gallbladder: The gallbladder is non-distended and appears otherwise unremarkable.    Pancreas: The pancreas appears unremarkable.    Spleen: The spleen appears unremarkable.    Adrenals: The adrenal glands appear unremarkable.    Kidneys: The kidneys appear unremarkable with no stones cysts masses or hydronephrosis.    Aorta: The abdominal aorta appears unremarkable.    IVC: Unremarkable.    Bowel:    Esophagus: The visualized esophagus appears unremarkable.    Stomach: The stomach appears unremarkable.    Duodenum: Unremarkable  appearing duodenum.    Small Bowel: The small bowel appears unremarkable.    Colon: There is moderate stool in the colon which could reflect an element of constipation.    Appendix: The appendix is mildly thickened to 9mm (series 6 image 41) but without regional inflammation. Correlate for the possibility of early appendictis.    Peritoneum: There are a few prominent lymph nodes throughout the abdominal mesentery including right lower quadrant mid abdominal mesentery. Correlate with clinical and laboratory finding as regards a possible element of mesenteric adenitis.    Pelvis:    Bladder: The bladder appears unremarkable.    Female:    Uterus: The uterus appears unremarkable.    Ovaries: The ovaries appear unremarkable.    Bony structures:    Dorsal Spine: The visualized dorsal spine appears unremarkable.    Notifications: The results were discussed with the emergency room physician Dr. Lockhart prior to dictation at 2022-08-23 03:09:47 CDT.                    Preliminary result by Interface, Rad Results In (08/23/22 02:26:04)                 Narrative:    START OF REPORT:  Technique: CT of the abdomen and pelvis was performed with axial images as well as sagittal and coronal reconstruction images with intravenous contrast.    Comparison: Comparison is with study [id number] 22:39:29.    Clinical History: (Also pelvic pain. Patient had her pituitary gland remove 2019 . Has not had a peroid in a while due to this.    Dosage Information: Automated Exposure Control was utilized.    Findings:  Lines and Tubes: None.  Thorax:  Lungs: The visualized lung bases appear unremarkable. No focal infiltrate or consolidation is seen.  Pleura: No effusions or thickening are seen. No pneumothorax is seen in the visualized lung bases.  Heart: The heart size is within normal limits.  Abdomen:  Abdominal Wall: No abdominal wall pathology is seen.  Liver: The liver appears unremarkable.  Biliary System: No intrahepatic or  extrahepatic biliary duct dilatation is seen.  Gallbladder: The gallbladder is non-distended and appears otherwise unremarkable.  Pancreas: The pancreas appears unremarkable.  Spleen: The spleen appears unremarkable.  Adrenals: The adrenal glands appear unremarkable.  Kidneys: The kidneys appear unremarkable with no stones cysts masses or hydronephrosis.  Aorta: The abdominal aorta appears unremarkable.  IVC: Unremarkable.  Bowel:  Esophagus: The visualized esophagus appears unremarkable.  Stomach: The stomach appears unremarkable.  Duodenum: Unremarkable appearing duodenum.  Small Bowel: The small bowel appears unremarkable.  Colon: There is moderate stool in the colon which could reflect an element of constipation.  Appendix: The appendix is mildly thickened to 9mm (series 6 image 41) but without regional inflammation. Correlate for the possibility of early appendictis.  Peritoneum: There are a few prominent lymph nodes throughout the abdominal mesentery including right lower quadrant mid abdominal mesentery. Correlate with clinical and laboratory finding as regards a possible element of mesenteric adenitis.    Pelvis:  Bladder: The bladder appears unremarkable.  Female:  Uterus: The uterus appears unremarkable.  Ovaries: The ovaries appear unremarkable.    Bony structures:  Dorsal Spine: The visualized dorsal spine appears unremarkable.    Notifications: The results were discussed with the emergency room physician Dr. Lockhart prior to dictation at 2022-08-23 03:09:47 CDT.      Impression:  1. The appendix is mildly thickened to 9mm (series 6 image 41) but without regional inflammation. Correlate for the possibility of early appendictis.  2. There are a few prominent lymph nodes throughout the abdominal mesentery including right lower quadrant mid abdominal mesentery. Correlate with clinical and laboratory finding as regards a possible element of mesenteric adenitis.  3. Details and other findings as discussed  above.                      Preliminary result by Rashaad Swanson MD (08/23/22 02:26:04)                 Narrative:    START OF REPORT:  Technique: CT of the abdomen and pelvis was performed with axial images as well as sagittal and coronal reconstruction images with intravenous contrast.    Comparison: Comparison is with study [id number] 22:39:29.    Clinical History: (Also pelvic pain. Patient had her pituitary gland remove 2019 . Has not had a peroid in a while due to this.    Dosage Information: Automated Exposure Control was utilized.    Findings:  Lines and Tubes: None.  Thorax:  Lungs: The visualized lung bases appear unremarkable. No focal infiltrate or consolidation is seen.  Pleura: No effusions or thickening are seen. No pneumothorax is seen in the visualized lung bases.  Heart: The heart size is within normal limits.  Abdomen:  Abdominal Wall: No abdominal wall pathology is seen.  Liver: The liver appears unremarkable.  Biliary System: No intrahepatic or extrahepatic biliary duct dilatation is seen.  Gallbladder: The gallbladder is non-distended and appears otherwise unremarkable.  Pancreas: The pancreas appears unremarkable.  Spleen: The spleen appears unremarkable.  Adrenals: The adrenal glands appear unremarkable.  Kidneys: The kidneys appear unremarkable with no stones cysts masses or hydronephrosis.  Aorta: The abdominal aorta appears unremarkable.  IVC: Unremarkable.  Bowel:  Esophagus: The visualized esophagus appears unremarkable.  Stomach: The stomach appears unremarkable.  Duodenum: Unremarkable appearing duodenum.  Small Bowel: The small bowel appears unremarkable.  Colon: There is moderate stool in the colon which could reflect an element of constipation.  Appendix: The appendix is mildly thickened to 9mm (series 6 image 41) but without regional inflammation. Correlate for the possibility of early appendictis.  Peritoneum: There are a few prominent lymph nodes throughout the abdominal  mesentery including right lower quadrant mid abdominal mesentery. Correlate with clinical and laboratory finding as regards a possible element of mesenteric adenitis.    Pelvis:  Bladder: The bladder appears unremarkable.  Female:  Uterus: The uterus appears unremarkable.  Ovaries: The ovaries appear unremarkable.    Bony structures:  Dorsal Spine: The visualized dorsal spine appears unremarkable.    Notifications: The results were discussed with the emergency room physician Dr. Lockhart prior to dictation at 2022-08-23 03:09:47 CDT.      Impression:  1. The appendix is mildly thickened to 9mm (series 6 image 41) but without regional inflammation. Correlate for the possibility of early appendictis.  2. There are a few prominent lymph nodes throughout the abdominal mesentery including right lower quadrant mid abdominal mesentery. Correlate with clinical and laboratory finding as regards a possible element of mesenteric adenitis.  3. Details and other findings as discussed above.                                3:12 AM Report noted.  Discussed with the reading radiologist to believes the findings probably do represent appendicitis.  Consulting General Surgery.    6:37 AM Surgery consultation appreciated, see their notes.  Clinically felt not to represent appendicitis, appropriate for expectant outpatient management and follow up p.r.n..       Medications   iopamidoL (ISOVUE-370) injection 100 mL (100 mLs Intravenous Given 8/23/22 0230)   lactated ringers infusion (0 mLs Intravenous Stopped 8/23/22 0423)                          Clinical Impression:   Final diagnoses:  [R10.30] Lower abdominal pain (Primary)          ED Disposition Condition    Discharge Stable        ED Prescriptions     None        Follow-up Information     Follow up With Specialties Details Why Contact Info    [facility] - Emergency Dept Emergency Medicine  As needed [address] [facility] [address] [phone]            Antony Lockhart MD  08/23/22 0631

## 2022-08-23 NOTE — Clinical Note
"Mely Ochoakenia" Antony was seen and treated in our emergency department on 8/22/2022.  She may return to work on 08/24/2022.       If you have any questions or concerns, please don't hesitate to call.      STANLEY MATUTE RN    "

## 2022-09-05 ENCOUNTER — HOSPITAL ENCOUNTER (EMERGENCY)
Facility: HOSPITAL | Age: 22
Discharge: HOME | End: 2022-09-05
Attending: FAMILY MEDICINE
Payer: MEDICAID

## 2022-09-05 VITALS
BODY MASS INDEX: 23.56 KG/M2 | DIASTOLIC BLOOD PRESSURE: 58 MMHG | TEMPERATURE: 99 F | HEART RATE: 98 BPM | OXYGEN SATURATION: 100 % | HEIGHT: 67 IN | SYSTOLIC BLOOD PRESSURE: 91 MMHG | RESPIRATION RATE: 20 BRPM | WEIGHT: 150.13 LBS

## 2022-09-05 DIAGNOSIS — M54.50 BILATERAL LOW BACK PAIN WITHOUT SCIATICA, UNSPECIFIED CHRONICITY: Primary | ICD-10-CM

## 2022-09-05 DIAGNOSIS — R07.9 NONSPECIFIC CHEST PAIN: ICD-10-CM

## 2022-09-05 LAB
ALBUMIN SERPL-MCNC: 4.3 GM/DL (ref 3.5–5)
ALBUMIN/GLOB SERPL: 1.3 RATIO (ref 1.1–2)
ALP SERPL-CCNC: 90 UNIT/L (ref 40–150)
ALT SERPL-CCNC: 11 UNIT/L (ref 0–55)
APPEARANCE UR: CLEAR
AST SERPL-CCNC: 23 UNIT/L (ref 5–34)
B-HCG UR QL: NEGATIVE
BACTERIA #/AREA URNS AUTO: ABNORMAL /HPF
BASOPHILS # BLD AUTO: 0.04 X10(3)/MCL (ref 0–0.2)
BASOPHILS NFR BLD AUTO: 0.4 %
BILIRUB UR QL STRIP.AUTO: NEGATIVE MG/DL
BILIRUBIN DIRECT+TOT PNL SERPL-MCNC: 0.7 MG/DL
BUN SERPL-MCNC: 6.5 MG/DL (ref 7–18.7)
CALCIUM SERPL-MCNC: 9.7 MG/DL (ref 8.4–10.2)
CHLORIDE SERPL-SCNC: 104 MMOL/L (ref 98–107)
CO2 SERPL-SCNC: 26 MMOL/L (ref 22–29)
COLOR UR AUTO: COLORLESS
CREAT SERPL-MCNC: 1.1 MG/DL (ref 0.55–1.02)
CTP QC/QA: YES
EOSINOPHIL # BLD AUTO: 0.18 X10(3)/MCL (ref 0–0.9)
EOSINOPHIL NFR BLD AUTO: 2 %
ERYTHROCYTE [DISTWIDTH] IN BLOOD BY AUTOMATED COUNT: 12.5 % (ref 11.5–17)
GFR SERPLBLD CREATININE-BSD FMLA CKD-EPI: >60 MLS/MIN/1.73/M2
GLOBULIN SER-MCNC: 3.2 GM/DL (ref 2.4–3.5)
GLUCOSE SERPL-MCNC: 101 MG/DL (ref 74–100)
GLUCOSE UR QL STRIP.AUTO: NORMAL MG/DL
HCT VFR BLD AUTO: 36.7 % (ref 37–47)
HGB BLD-MCNC: 11.3 GM/DL (ref 12–16)
HYALINE CASTS #/AREA URNS LPF: ABNORMAL /LPF
IMM GRANULOCYTES # BLD AUTO: 0.02 X10(3)/MCL (ref 0–0.04)
IMM GRANULOCYTES NFR BLD AUTO: 0.2 %
KETONES UR QL STRIP.AUTO: NEGATIVE MG/DL
LEUKOCYTE ESTERASE UR QL STRIP.AUTO: 75 UNIT/L
LYMPHOCYTES # BLD AUTO: 2.96 X10(3)/MCL (ref 0.6–4.6)
LYMPHOCYTES NFR BLD AUTO: 32.7 %
MCH RBC QN AUTO: 27.2 PG (ref 27–31)
MCHC RBC AUTO-ENTMCNC: 30.8 MG/DL (ref 33–36)
MCV RBC AUTO: 88.2 FL (ref 80–94)
MONOCYTES # BLD AUTO: 0.84 X10(3)/MCL (ref 0.1–1.3)
MONOCYTES NFR BLD AUTO: 9.3 %
NEUTROPHILS # BLD AUTO: 5 X10(3)/MCL (ref 2.1–9.2)
NEUTROPHILS NFR BLD AUTO: 55.4 %
NITRITE UR QL STRIP.AUTO: NEGATIVE
NRBC BLD AUTO-RTO: 0 %
PH UR STRIP.AUTO: 5.5 [PH]
PLATELET # BLD AUTO: 272 X10(3)/MCL (ref 130–400)
PMV BLD AUTO: 12.2 FL (ref 7.4–10.4)
POTASSIUM SERPL-SCNC: 4 MMOL/L (ref 3.5–5.1)
PROT SERPL-MCNC: 7.5 GM/DL (ref 6.4–8.3)
PROT UR QL STRIP.AUTO: NEGATIVE MG/DL
RBC # BLD AUTO: 4.16 X10(6)/MCL (ref 4.2–5.4)
RBC #/AREA URNS AUTO: ABNORMAL /HPF
RBC UR QL AUTO: ABNORMAL UNIT/L
SODIUM SERPL-SCNC: 141 MMOL/L (ref 136–145)
SP GR UR STRIP.AUTO: 1
SQUAMOUS #/AREA URNS LPF: ABNORMAL /HPF
TROPONIN I SERPL-MCNC: <0.01 NG/ML (ref 0–0.04)
UROBILINOGEN UR STRIP-ACNC: NORMAL MG/DL
WBC # SPEC AUTO: 9.1 X10(3)/MCL (ref 4.5–11.5)
WBC #/AREA URNS AUTO: ABNORMAL /HPF

## 2022-09-05 PROCEDURE — 80053 COMPREHEN METABOLIC PANEL: CPT | Performed by: NURSE PRACTITIONER

## 2022-09-05 PROCEDURE — 84484 ASSAY OF TROPONIN QUANT: CPT | Performed by: NURSE PRACTITIONER

## 2022-09-05 PROCEDURE — 99284 EMERGENCY DEPT VISIT MOD MDM: CPT | Mod: 25

## 2022-09-05 PROCEDURE — 85025 COMPLETE CBC W/AUTO DIFF WBC: CPT | Performed by: NURSE PRACTITIONER

## 2022-09-05 PROCEDURE — 25000003 PHARM REV CODE 250: Performed by: NURSE PRACTITIONER

## 2022-09-05 PROCEDURE — 81001 URINALYSIS AUTO W/SCOPE: CPT | Performed by: NURSE PRACTITIONER

## 2022-09-05 PROCEDURE — 96361 HYDRATE IV INFUSION ADD-ON: CPT

## 2022-09-05 PROCEDURE — 36415 COLL VENOUS BLD VENIPUNCTURE: CPT | Performed by: NURSE PRACTITIONER

## 2022-09-05 PROCEDURE — 63600175 PHARM REV CODE 636 W HCPCS: Performed by: NURSE PRACTITIONER

## 2022-09-05 PROCEDURE — 96374 THER/PROPH/DIAG INJ IV PUSH: CPT

## 2022-09-05 PROCEDURE — 81025 URINE PREGNANCY TEST: CPT | Performed by: NURSE PRACTITIONER

## 2022-09-05 RX ORDER — MAG HYDROX/ALUMINUM HYD/SIMETH 200-200-20
30 SUSPENSION, ORAL (FINAL DOSE FORM) ORAL ONCE
Status: COMPLETED | OUTPATIENT
Start: 2022-09-05 | End: 2022-09-05

## 2022-09-05 RX ORDER — KETOROLAC TROMETHAMINE 30 MG/ML
15 INJECTION, SOLUTION INTRAMUSCULAR; INTRAVENOUS
Status: COMPLETED | OUTPATIENT
Start: 2022-09-05 | End: 2022-09-05

## 2022-09-05 RX ORDER — PANTOPRAZOLE SODIUM 40 MG/1
40 TABLET, DELAYED RELEASE ORAL DAILY
Qty: 30 TABLET | Refills: 0 | Status: SHIPPED | OUTPATIENT
Start: 2022-09-05 | End: 2022-10-05

## 2022-09-05 RX ORDER — LIDOCAINE HYDROCHLORIDE 20 MG/ML
15 SOLUTION OROPHARYNGEAL ONCE
Status: COMPLETED | OUTPATIENT
Start: 2022-09-05 | End: 2022-09-05

## 2022-09-05 RX ORDER — DICLOFENAC SODIUM 75 MG/1
75 TABLET, DELAYED RELEASE ORAL 2 TIMES DAILY
Qty: 14 TABLET | Refills: 0 | Status: SHIPPED | OUTPATIENT
Start: 2022-09-05 | End: 2022-09-12

## 2022-09-05 RX ADMIN — ALUMINUM HYDROXIDE, MAGNESIUM HYDROXIDE, AND SIMETHICONE 30 ML: 200; 200; 20 SUSPENSION ORAL at 06:09

## 2022-09-05 RX ADMIN — LIDOCAINE HYDROCHLORIDE 15 ML: 20 SOLUTION ORAL; TOPICAL at 06:09

## 2022-09-05 RX ADMIN — SODIUM CHLORIDE 1000 ML: 9 INJECTION, SOLUTION INTRAVENOUS at 06:09

## 2022-09-05 RX ADMIN — KETOROLAC TROMETHAMINE 15 MG: 30 INJECTION, SOLUTION INTRAMUSCULAR; INTRAVENOUS at 06:09

## 2022-09-05 NOTE — ED PROVIDER NOTES
Encounter Date: 9/5/2022       History     Chief Complaint   Patient presents with    Headache     Reports headache, chest pain, back pain, and generalized weakness x3 days.Denies fever. Bp 91/58     Pt is a 22 y.o. female who presents to the [facility] ED complaining of chest pain which began approx 3 days ago. Denies trauma to chest, SOB, fever, dizziness, pain with urination, or loss of bowel or bladder control. Reports pain is in centralized chest and worsens with eating and laying flat. Pt also reports mild lower back pain. Denies direct injury to area.    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 Prolactin levels remain low (about 5) in the absence of dopamine agonist treatment since the surgery.    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     Past Surgical History:   Procedure Laterality Date    ADENOIDECTOMY      SURGICAL REMOVAL OF PITUITARY TUMOR BY TRANSSPHENOIDAL APPROACH N/A 1/3/2020    Procedure: RESECTION, NEOPLASM, PITUITARY, TRANSSPHENOIDAL APPROACH ETSR /c navigation in joint procedure /c ENT;  Surgeon: Dino Llamas MD;  Location: [facility] MAIN OR;  Service: ENT;  Laterality: N/A;    TONSILLECTOMY      TONSILLECTOMY AND ADENOIDECTOMY      TYMPANOSTOMY TUBE PLACEMENT       Family History   Problem Relation Age of Onset    No Known Problems Mother     No Known Problems Father     No Known Problems Sister     No Known Problems Brother     No Known Problems Maternal Aunt     No Known Problems Maternal Uncle     No Known Problems Paternal Aunt     No Known Problems Paternal Uncle     Cancer Maternal Grandmother     Hypertension Maternal Grandmother     No Known Problems Maternal Grandfather     No Known Problems Paternal Grandmother     No  Known Problems Paternal Grandfather     Aneurysm Neg Hx     Clotting disorder Neg Hx     Dementia Neg Hx     Diabetes Neg Hx     Fainting Neg Hx     Heart disease Neg Hx     Hyperlipidemia Neg Hx     Kidney disease Neg Hx     Liver disease Neg Hx     Migraines Neg Hx     Neuropathy Neg Hx     Obesity Neg Hx     Parkinsonism Neg Hx     Seizures Neg Hx     Stroke Neg Hx     Tremor Neg Hx      Social History     Tobacco Use    Smoking status: Never    Smokeless tobacco: Never   Substance Use Topics    Alcohol use: Never    Drug use: Never     Review of Systems   Constitutional:  Negative for chills, diaphoresis, fatigue and fever.   HENT:  Negative for facial swelling, rhinorrhea, sinus pressure, sinus pain, sore throat and trouble swallowing.    Respiratory:  Negative for cough, chest tightness, shortness of breath and wheezing.    Cardiovascular:  Positive for chest pain. Negative for palpitations and leg swelling.   Gastrointestinal:  Positive for abdominal pain. Negative for diarrhea, nausea and vomiting.   Genitourinary:  Negative for dysuria, flank pain, frequency, hematuria and urgency.   Musculoskeletal:  Negative for arthralgias, back pain, joint swelling and myalgias.   Skin:  Negative for color change and rash.   Neurological:  Negative for dizziness, syncope, weakness and light-headedness.   Hematological:  Does not bruise/bleed easily.   All other systems reviewed and are negative.    Physical Exam     Initial Vitals [09/05/22 1542]   BP Pulse Resp Temp SpO2   (!) 91/58 109 20 98.6 °F (37 °C) 100 %      MAP       --         Physical Exam    Nursing note and vitals reviewed.  Constitutional: She appears well-developed and well-nourished.   HENT:   Head: Normocephalic and atraumatic.   Nose: Nose normal.   Mouth/Throat: Oropharynx is clear and moist.   Eyes: Conjunctivae and EOM are normal. Pupils are equal, round, and reactive to light.   Neck: Neck supple.   Normal range of motion.  Cardiovascular:  Normal  rate, regular rhythm, normal heart sounds and intact distal pulses.           Pulmonary/Chest: Effort normal and breath sounds normal. No respiratory distress. She has no wheezes. She has no rhonchi. She has no rales. She exhibits tenderness. She exhibits no mass, no crepitus and no edema.     Abdominal: Abdomen is soft and flat. Bowel sounds are normal. She exhibits no distension. There is no abdominal tenderness. There is no rebound, no guarding, no tenderness at McBurney's point and negative Caruso's sign. negative psoas sign  Musculoskeletal:         General: Normal range of motion.      Cervical back: Normal range of motion and neck supple.     Neurological: She is alert and oriented to person, place, and time. She has normal strength and normal reflexes.   Skin: Skin is warm and dry. Capillary refill takes less than 2 seconds.   Psychiatric: She has a normal mood and affect. Her speech is normal and behavior is normal. Judgment and thought content normal.       ED Course   Procedures  Labs Reviewed   URINALYSIS, REFLEX TO URINE CULTURE - Abnormal; Notable for the following components:       Result Value    Blood, UA 1+ (*)     WBC, UA 6-10 (*)     All other components within normal limits   COMPREHENSIVE METABOLIC PANEL - Abnormal; Notable for the following components:    Glucose Level 101 (*)     Blood Urea Nitrogen 6.5 (*)     Creatinine 1.10 (*)     All other components within normal limits   CBC WITH DIFFERENTIAL - Abnormal; Notable for the following components:    RBC 4.16 (*)     Hgb 11.3 (*)     Hct 36.7 (*)     MCHC 30.8 (*)     MPV 12.2 (*)     All other components within normal limits   TROPONIN I - Normal   CBC W/ AUTO DIFFERENTIAL    Narrative:     The following orders were created for panel order CBC auto differential.  Procedure                               Abnormality         Status                     ---------                               -----------         ------                     CBC  with Differential[[id number]]        Abnormal            Final result                 Please view results for these tests on the individual orders.   EXTRA TUBES    Narrative:     The following orders were created for panel order EXTRA TUBES.  Procedure                               Abnormality         Status                     ---------                               -----------         ------                     Light Blue Top Hold[[id number]]                              In process                   Please view results for these tests on the individual orders.   LIGHT BLUE TOP HOLD   POCT URINE PREGNANCY          Imaging Results    None          Medications   sodium chloride 0.9% bolus 1,000 mL (has no administration in time range)   aluminum-magnesium hydroxide-simethicone 200-200-20 mg/5 mL suspension 30 mL (has no administration in time range)     And   LIDOcaine HCl 2% oral solution 15 mL (has no administration in time range)   ketorolac injection 15 mg (has no administration in time range)     Medical Decision Making:   Differential Diagnosis:   Nonspecific chest pain  GERD  AMI  Clinical Tests:   Lab Tests: Ordered and Reviewed  Radiological Study: Ordered and Reviewed  ED Management:  6:03 PM Reassessed patient at this time. Reports condition has improved. Discussed with patient all pertinent ED information and results. Discussed diagnosis and treatment plan with patient. Follow up instructions and return to ED instruction have been given. All questions and concerns were addressed at this time. Patient voices understanding of information and instructions. Patient is comfortable with plan and discharge. Patient is stable for discharge.                       Clinical Impression:   Final diagnoses:  [R07.9] Nonspecific chest pain  [M54.50] Bilateral low back pain without sciatica, unspecified chronicity (Primary)        ED Disposition Condition    Discharge Stable          ED Prescriptions       Medication Sig  Dispense Start Date End Date Auth. Provider    diclofenac (VOLTAREN) 75 MG EC tablet Take 1 tablet (75 mg total) by mouth 2 (two) times daily. for 7 days 14 tablet 9/5/2022 9/12/2022 Antony Bolanos Jr., MILEY    pantoprazole (PROTONIX) 40 MG tablet Take 1 tablet (40 mg total) by mouth once daily. 30 tablet 9/5/2022 10/5/2022 MILEY Crabtree Jr.          Follow-up Information       Follow up With Specialties Details Why Contact Info    [facility] - Emergency Dept Emergency Medicine In 3 days As needed, If symptoms worsen [address] [facility] [id number] [phone]             MILEY Crabtree Jr.  09/05/22 7084

## 2022-09-05 NOTE — Clinical Note
"Mely Vee" Antony was seen and treated in our emergency department on 9/5/2022.  She may return to work on 09/06/2022.       If you have any questions or concerns, please don't hesitate to call.      BRAXTONN RN RN    "

## 2022-09-05 NOTE — DISCHARGE INSTRUCTIONS
Follow up with your primary care physician in 3-5 days for follow up evaluation.  Increase fluid intake, clear liquids x 12 hours. Advance diet slowly.   Avoid greasy, spicy foods.  Return to the [facility] ED for worsening pain, chest pain, or bleeding in emesis or stool.

## 2022-09-18 ENCOUNTER — HISTORICAL (OUTPATIENT)
Dept: ADMINISTRATIVE | Facility: HOSPITAL | Age: 22
End: 2022-09-18
Payer: MEDICAID

## 2022-11-02 ENCOUNTER — PATIENT MESSAGE (OUTPATIENT)
Dept: ADMINISTRATIVE | Facility: HOSPITAL | Age: 22
End: 2022-11-02
Payer: MEDICAID

## 2023-01-05 ENCOUNTER — OFFICE VISIT (OUTPATIENT)
Dept: FAMILY MEDICINE | Facility: CLINIC | Age: 23
End: 2023-01-05
Payer: MEDICAID

## 2023-01-05 VITALS
HEART RATE: 86 BPM | HEIGHT: 67 IN | DIASTOLIC BLOOD PRESSURE: 68 MMHG | TEMPERATURE: 98 F | OXYGEN SATURATION: 100 % | SYSTOLIC BLOOD PRESSURE: 101 MMHG | BODY MASS INDEX: 22.66 KG/M2 | WEIGHT: 144.38 LBS

## 2023-01-05 DIAGNOSIS — J02.9 SORE THROAT: Primary | ICD-10-CM

## 2023-01-05 DIAGNOSIS — N91.0 PRIMARY AMENORRHEA: ICD-10-CM

## 2023-01-05 DIAGNOSIS — E89.3 HYPOPITUITARISM AFTER ADENOMA RESECTION: ICD-10-CM

## 2023-01-05 DIAGNOSIS — R09.81 CONGESTION OF NASAL SINUS: ICD-10-CM

## 2023-01-05 LAB
FLUAV AG UPPER RESP QL IA.RAPID: NOT DETECTED
FLUBV AG UPPER RESP QL IA.RAPID: NOT DETECTED
RSV A 5' UTR RNA NPH QL NAA+PROBE: NOT DETECTED
SARS-COV-2 RNA RESP QL NAA+PROBE: DETECTED
STREP A PCR (OHS): NOT DETECTED

## 2023-01-05 PROCEDURE — 0241U COVID/RSV/FLU A&B PCR: CPT

## 2023-01-05 PROCEDURE — 87651 STREP A DNA AMP PROBE: CPT

## 2023-01-05 PROCEDURE — 99214 OFFICE O/P EST MOD 30 MIN: CPT | Mod: PBBFAC

## 2023-01-06 NOTE — PROGRESS NOTES
[facility] Note    Subjective:     Patient ID: Mely Hardy is a 22 y.o. female    Chief Complaint:   Chief Complaint   Patient presents with    C/o runny nose,cough     HPI  22 yo F presents for Routine Follow up    Acute Concerns:  Patient complains of URI like symptoms including cough, runny nose, and mild sore throat for about a week. She has a non productive cough. She denies any fevers, chills, nausea/vomiting, or diarrhea. No difficulty breathing chest pain or SOB. Not aware of any sick contact.    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 Currently stable. Will be seeing neurosurgery soon, but awaiting insurance approval for surveillance MRI.    Primary Amenorrhea: Patient continues on OCP, mother concerned about patient not having menstrual period. Used to follow in GYN clinic, no longer going as she is not sexually active and not ready for pap smear/pelvic exam    Review of Systems  As per HPI    Objective:     Vitals:    01/05/23 1559   BP: 101/68   Pulse: 86   Temp: 98.4 °F (36.9 °C)       Physical Exam    General: Well developed, no acute distress  HEENT: oral mucosa moist, no pharyngeal erythema. 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     Other Visit Diagnoses       Sore throat    -  Primary    Relevant Orders    Strep Group A by PCR (Completed)    Congestion of nasal sinus        Relevant Orders    COVID/RSV/FLU A&B PCR (Completed)            Plan:       Cough/Congestion  Sore Throat  - COVID/RSV/Influenza swab in office  - Strep A Swab in office  - Advised to continue with Mucinex liquid for cough and congestion    Panhypopituitarism   - Continue to follow  with Endo and Neurosurgery in [place]  - Continue with Desmopressin and Hydrocortisone    Primary Amenorrhea  - Continue wit Sprintec  - Will need hormone specialist in future for fertility purposes     RTC in 3 months    Alexus Lambert MD  [employer]   PGY-3

## 2023-02-23 ENCOUNTER — APPOINTMENT (OUTPATIENT)
Dept: RADIOLOGY | Facility: HOSPITAL | Age: 23
End: 2023-02-23
Attending: NEUROLOGICAL SURGERY
Payer: MEDICAID

## 2023-02-23 DIAGNOSIS — D35.2 PITUITARY ADENOMA: ICD-10-CM

## 2023-02-23 PROCEDURE — 25500020 PHARM REV CODE 255: Performed by: NEUROLOGICAL SURGERY

## 2023-02-23 PROCEDURE — A9577 INJ MULTIHANCE: HCPCS | Performed by: NEUROLOGICAL SURGERY

## 2023-02-23 PROCEDURE — 70553 MRI BRAIN STEM W/O & W/DYE: CPT | Mod: TC

## 2023-02-23 RX ADMIN — GADOBENATE DIMEGLUMINE 15 ML: 529 INJECTION, SOLUTION INTRAVENOUS at 02:02

## 2023-03-10 ENCOUNTER — HOSPITAL ENCOUNTER (EMERGENCY)
Facility: HOSPITAL | Age: 23
Discharge: HOME | End: 2023-03-10
Attending: FAMILY MEDICINE
Payer: MEDICAID

## 2023-03-10 VITALS
BODY MASS INDEX: 23.94 KG/M2 | HEIGHT: 67 IN | HEART RATE: 68 BPM | OXYGEN SATURATION: 100 % | SYSTOLIC BLOOD PRESSURE: 96 MMHG | TEMPERATURE: 98 F | DIASTOLIC BLOOD PRESSURE: 58 MMHG | WEIGHT: 152.56 LBS | RESPIRATION RATE: 17 BRPM

## 2023-03-10 DIAGNOSIS — R51.9 NONINTRACTABLE HEADACHE, UNSPECIFIED CHRONICITY PATTERN, UNSPECIFIED HEADACHE TYPE: Primary | ICD-10-CM

## 2023-03-10 LAB
ALBUMIN SERPL-MCNC: 4 G/DL (ref 3.5–5)
ALBUMIN/GLOB SERPL: 1.4 RATIO (ref 1.1–2)
ALP SERPL-CCNC: 70 UNIT/L (ref 40–150)
ALT SERPL-CCNC: 14 UNIT/L (ref 0–55)
APPEARANCE UR: CLEAR
AST SERPL-CCNC: 27 UNIT/L (ref 5–34)
B-HCG UR QL: NEGATIVE
BACTERIA #/AREA URNS AUTO: ABNORMAL /HPF
BASOPHILS # BLD AUTO: 0.04 X10(3)/MCL (ref 0–0.2)
BASOPHILS NFR BLD AUTO: 0.6 %
BILIRUB UR QL STRIP.AUTO: NEGATIVE MG/DL
BILIRUBIN DIRECT+TOT PNL SERPL-MCNC: 0.5 MG/DL
BUN SERPL-MCNC: 6.7 MG/DL (ref 7–18.7)
CALCIUM SERPL-MCNC: 9.7 MG/DL (ref 8.4–10.2)
CHLORIDE SERPL-SCNC: 107 MMOL/L (ref 98–107)
CO2 SERPL-SCNC: 27 MMOL/L (ref 22–29)
COLOR UR AUTO: COLORLESS
CREAT SERPL-MCNC: 1.07 MG/DL (ref 0.55–1.02)
CTP QC/QA: YES
EOSINOPHIL # BLD AUTO: 0.26 X10(3)/MCL (ref 0–0.9)
EOSINOPHIL NFR BLD AUTO: 4.1 %
ERYTHROCYTE [DISTWIDTH] IN BLOOD BY AUTOMATED COUNT: 12.1 % (ref 11.5–17)
GFR SERPLBLD CREATININE-BSD FMLA CKD-EPI: >60 MLS/MIN/1.73/M2
GLOBULIN SER-MCNC: 2.9 GM/DL (ref 2.4–3.5)
GLUCOSE SERPL-MCNC: 87 MG/DL (ref 74–100)
GLUCOSE UR QL STRIP.AUTO: NORMAL MG/DL
HCT VFR BLD AUTO: 31.7 % (ref 37–47)
HGB BLD-MCNC: 10.3 G/DL (ref 12–16)
HYALINE CASTS #/AREA URNS LPF: ABNORMAL /LPF
IMM GRANULOCYTES # BLD AUTO: 0.01 X10(3)/MCL (ref 0–0.04)
IMM GRANULOCYTES NFR BLD AUTO: 0.2 %
KETONES UR QL STRIP.AUTO: NEGATIVE MG/DL
LEUKOCYTE ESTERASE UR QL STRIP.AUTO: NEGATIVE UNIT/L
LYMPHOCYTES # BLD AUTO: 3.38 X10(3)/MCL (ref 0.6–4.6)
LYMPHOCYTES NFR BLD AUTO: 53.7 %
MCH RBC QN AUTO: 28.5 PG
MCHC RBC AUTO-ENTMCNC: 32.5 G/DL (ref 33–36)
MCV RBC AUTO: 87.6 FL (ref 80–94)
MONOCYTES # BLD AUTO: 0.42 X10(3)/MCL (ref 0.1–1.3)
MONOCYTES NFR BLD AUTO: 6.7 %
NEUTROPHILS # BLD AUTO: 2.18 X10(3)/MCL (ref 2.1–9.2)
NEUTROPHILS NFR BLD AUTO: 34.7 %
NITRITE UR QL STRIP.AUTO: NEGATIVE
NRBC BLD AUTO-RTO: 0 %
PH UR STRIP.AUTO: 6 [PH]
PLATELET # BLD AUTO: 235 X10(3)/MCL (ref 130–400)
PMV BLD AUTO: 11 FL (ref 7.4–10.4)
POTASSIUM SERPL-SCNC: 4.4 MMOL/L (ref 3.5–5.1)
PROT SERPL-MCNC: 6.9 GM/DL (ref 6.4–8.3)
PROT UR QL STRIP.AUTO: NEGATIVE MG/DL
RBC # BLD AUTO: 3.62 X10(6)/MCL (ref 4.2–5.4)
RBC #/AREA URNS AUTO: ABNORMAL /HPF
RBC UR QL AUTO: NEGATIVE UNIT/L
SODIUM SERPL-SCNC: 140 MMOL/L (ref 136–145)
SP GR UR STRIP.AUTO: 1
SQUAMOUS #/AREA URNS LPF: ABNORMAL /HPF
UROBILINOGEN UR STRIP-ACNC: NORMAL MG/DL
WBC # SPEC AUTO: 6.3 X10(3)/MCL (ref 4.5–11.5)
WBC #/AREA URNS AUTO: ABNORMAL /HPF

## 2023-03-10 PROCEDURE — 81025 URINE PREGNANCY TEST: CPT | Performed by: NURSE PRACTITIONER

## 2023-03-10 PROCEDURE — 25000003 PHARM REV CODE 250: Performed by: NURSE PRACTITIONER

## 2023-03-10 PROCEDURE — 99284 EMERGENCY DEPT VISIT MOD MDM: CPT

## 2023-03-10 PROCEDURE — 81001 URINALYSIS AUTO W/SCOPE: CPT | Performed by: NURSE PRACTITIONER

## 2023-03-10 PROCEDURE — 80053 COMPREHEN METABOLIC PANEL: CPT | Performed by: NURSE PRACTITIONER

## 2023-03-10 PROCEDURE — 85025 COMPLETE CBC W/AUTO DIFF WBC: CPT | Performed by: NURSE PRACTITIONER

## 2023-03-10 RX ORDER — ONDANSETRON 4 MG/1
4 TABLET, ORALLY DISINTEGRATING ORAL
Status: COMPLETED | OUTPATIENT
Start: 2023-03-10 | End: 2023-03-10

## 2023-03-10 RX ORDER — KETOROLAC TROMETHAMINE 10 MG/1
10 TABLET, FILM COATED ORAL
Status: COMPLETED | OUTPATIENT
Start: 2023-03-10 | End: 2023-03-10

## 2023-03-10 RX ORDER — BUTALBITAL, ACETAMINOPHEN AND CAFFEINE 50; 325; 40 MG/1; MG/1; MG/1
1 TABLET ORAL EVERY 4 HOURS PRN
Qty: 18 TABLET | Refills: 0 | Status: SHIPPED | OUTPATIENT
Start: 2023-03-10 | End: 2023-03-13

## 2023-03-10 RX ORDER — ONDANSETRON 4 MG/1
4 TABLET, ORALLY DISINTEGRATING ORAL EVERY 8 HOURS PRN
Qty: 9 TABLET | Refills: 0 | Status: SHIPPED | OUTPATIENT
Start: 2023-03-10 | End: 2023-03-13

## 2023-03-10 RX ADMIN — KETOROLAC TROMETHAMINE 10 MG: 10 TABLET, FILM COATED ORAL at 01:03

## 2023-03-10 RX ADMIN — ONDANSETRON 4 MG: 4 TABLET, ORALLY DISINTEGRATING ORAL at 01:03

## 2023-03-10 NOTE — DISCHARGE INSTRUCTIONS
Take medication as prescribed.  Follow up with your primary care physician in 3-5 days for follow up evaluation.

## 2023-03-10 NOTE — Clinical Note
"Mely Ochoakenia" Antony was seen and treated in our emergency department on 3/10/2023.  She may return to work on 03/11/2023.       If you have any questions or concerns, please don't hesitate to call.      DONTE Taylor RN RN    "

## 2023-03-10 NOTE — ED PROVIDER NOTES
Encounter Date: 3/10/2023       History     Chief Complaint   Patient presents with    Headache     Generalized X 2 days with nausea, denies dizziness or vision changes     Pt is a 23 y.o. female who presents to the [facility] ED complaining of headache and mild nausea. Symptoms present x 2 days. Pt with Hx prolactinoma, S/P pituitary tumor removal. Denies head injury, blurry vision, chest pain, SOB, dizziness, facial droop, weakness, abdominal pain, or loss of bowel or bladder control. Reports Hx of migraines in the past.    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 Prolactin levels remain low (about 5) in the absence of dopamine agonist treatment since the surgery.    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     Past Surgical History:   Procedure Laterality Date    ADENOIDECTOMY      SURGICAL REMOVAL OF PITUITARY TUMOR BY TRANSSPHENOIDAL APPROACH N/A 1/3/2020    Procedure: RESECTION, NEOPLASM, PITUITARY, TRANSSPHENOIDAL APPROACH ETSR /c navigation in joint procedure /c ENT;  Surgeon: Dino Llamas MD;  Location: [facility] MAIN OR;  Service: ENT;  Laterality: N/A;    TONSILLECTOMY      TONSILLECTOMY AND ADENOIDECTOMY      TYMPANOSTOMY TUBE PLACEMENT       Family History   Problem Relation Age of Onset    No Known Problems Mother     No Known Problems Father     No Known Problems Sister     No Known Problems Brother     No Known Problems Maternal Aunt     No Known Problems Maternal Uncle     No Known Problems Paternal Aunt     No Known Problems Paternal Uncle     Cancer Maternal Grandmother     Hypertension Maternal Grandmother     No Known Problems Maternal Grandfather     No Known Problems Paternal Grandmother     No Known Problems Paternal Grandfather      Aneurysm Neg Hx     Clotting disorder Neg Hx     Dementia Neg Hx     Diabetes Neg Hx     Fainting Neg Hx     Heart disease Neg Hx     Hyperlipidemia Neg Hx     Kidney disease Neg Hx     Liver disease Neg Hx     Migraines Neg Hx     Neuropathy Neg Hx     Obesity Neg Hx     Parkinsonism Neg Hx     Seizures Neg Hx     Stroke Neg Hx     Tremor Neg Hx      Social History     Tobacco Use    Smoking status: Never    Smokeless tobacco: Never   Substance Use Topics    Alcohol use: Never    Drug use: Never     Review of Systems   Constitutional:  Negative for chills, diaphoresis, fatigue and fever.   HENT:  Negative for facial swelling, rhinorrhea, sinus pressure, sinus pain, sore throat and trouble swallowing.    Respiratory:  Negative for cough, chest tightness, shortness of breath and wheezing.    Cardiovascular:  Negative for chest pain, palpitations and leg swelling.   Gastrointestinal:  Positive for nausea. Negative for abdominal pain, diarrhea and vomiting.   Genitourinary:  Negative for dysuria, flank pain, frequency, hematuria and urgency.   Musculoskeletal:  Negative for arthralgias, back pain, joint swelling and myalgias.   Skin:  Negative for color change and rash.   Neurological:  Positive for headaches. Negative for dizziness, syncope, weakness and light-headedness.   Hematological:  Does not bruise/bleed easily.   All other systems reviewed and are negative.    Physical Exam     Initial Vitals [03/10/23 1116]   BP Pulse Resp Temp SpO2   (!) 87/61 68 17 98.3 °F (36.8 °C) 100 %      MAP       --         Physical Exam    Nursing note and vitals reviewed.  Constitutional: She appears well-developed and well-nourished.   HENT:   Head: Normocephalic and atraumatic. Head is without raccoon's eyes and without Guzman's sign.       Nose: Nose normal.   Mouth/Throat: Oropharynx is clear and moist.   Eyes: Conjunctivae and EOM are normal. Pupils are equal, round, and reactive to light.   Neck: Neck supple.   Normal  range of motion.  Cardiovascular:  Normal rate, regular rhythm, normal heart sounds and intact distal pulses.           Pulmonary/Chest: Effort normal and breath sounds normal. No respiratory distress. She has no wheezes. She has no rhonchi. She has no rales. She exhibits no tenderness.   Abdominal: Abdomen is soft and flat. Bowel sounds are normal. She exhibits no distension. There is no abdominal tenderness. There is no rebound, no guarding, no tenderness at McBurney's point and negative Caruso's sign. negative psoas sign  Musculoskeletal:         General: Normal range of motion.      Cervical back: Normal range of motion and neck supple.     Neurological: She is alert and oriented to person, place, and time. She has normal strength and normal reflexes.   Skin: Skin is warm and dry. Capillary refill takes less than 2 seconds.   Psychiatric: She has a normal mood and affect. Her speech is normal and behavior is normal. Judgment and thought content normal.       ED Course   Procedures  Labs Reviewed   COMPREHENSIVE METABOLIC PANEL - Abnormal; Notable for the following components:       Result Value    Blood Urea Nitrogen 6.7 (*)     Creatinine 1.07 (*)     All other components within normal limits   CBC WITH DIFFERENTIAL - Abnormal; Notable for the following components:    RBC 3.62 (*)     Hgb 10.3 (*)     Hct 31.7 (*)     MCHC 32.5 (*)     MPV 11.0 (*)     All other components within normal limits   URINALYSIS, REFLEX TO URINE CULTURE - Abnormal; Notable for the following components:    Color, UA Colorless (*)     All other components within normal limits   CBC W/ AUTO DIFFERENTIAL    Narrative:     The following orders were created for panel order CBC auto differential.  Procedure                               Abnormality         Status                     ---------                               -----------         ------                     CBC with Differential[[id number]]        Abnormal            Final result                  Please view results for these tests on the individual orders.   POCT URINE PREGNANCY          Imaging Results    None          Medications   ketorolac tablet 10 mg (10 mg Oral Given 3/10/23 1321)   ondansetron disintegrating tablet 4 mg (4 mg Oral Given 3/10/23 1321)     Medical Decision Making:   History:   Old Medical Records: I decided to obtain old medical records.  Old Records Summarized: records from previous admission(s) and other records.       <> Summary of Records: Details    Reading Physician Reading Date Result Priority  Rosibel Bellamy MD  [phone] 2/23/2023 Routine    Narrative & Impression  EXAMINATION:  MRI PITUITARY W W/O CONTRAST     CLINICAL HISTORY:  Pituitary adenoma;Benign neoplasm of pituitary gland     TECHNIQUE:  Multiplanar multisequence thin section MR imaging of the sella was performed before and after the administration of intravenous contrast..  Additional limited whole brain images were also obtained.     COMPARISON:  03/20/2020     FINDINGS:  No diffusion abnormality is seen.  No obvious hemorrhage or infarction is seen.  Visualized portions of the brain parenchyma appear grossly unremarkable.  The patient is undergone transsphenoidal resection of a pituitary lesion.  The residual pituitary is seen and has a slightly flattened appearance.  There is no residual or recurrent lesion is seen.  There is inferior tethering  of the optic chiasm which was seen on prior examination as well.  It is unchanged.  Pituitary stalk is poorly visualized on this examination.     The carotid flow voids appear normal.  No distinct abnormality is seen in the region of the cavernous sinus.     Impression:     Postsurgical changes consistent with previous transsphenoidal resection of a sella mass with stable inferior tethering of the optic chiasm     No evidence of residual or recurrent lesion seen        Electronically signed by: Rosibel Bellamy  Date:                                             02/23/2023  Time:                                           17:36    Differential Diagnosis:   Migraine  Tension headache  Anemia  Electrolyte imbalance  Clinical Tests:   Lab Tests: Ordered and Reviewed  ED Management:  1:31 PM Reassessed patient at this time. Reports condition has improved. Discussed with patient all pertinent ED information and results. Discussed diagnosis and treatment plan with patient. Follow up instructions and return to ED instruction have been given. All questions and concerns were addressed at this time. Patient voices understanding of information and instructions. Patient is comfortable with plan and discharge. Patient is stable for discharge.                           Clinical Impression:   Final diagnoses:  [R51.9] Nonintractable headache, unspecified chronicity pattern, unspecified headache type (Primary)        ED Disposition Condition    Discharge Stable          ED Prescriptions       Medication Sig Dispense Start Date End Date Auth. Provider    ondansetron (ZOFRAN-ODT) 4 MG TbDL Take 1 tablet (4 mg total) by mouth every 8 (eight) hours as needed (Nausea). 9 tablet 3/10/2023 3/13/2023 Antony Bolanos Jr., KELLIEP    butalbital-acetaminophen-caffeine -40 mg (FIORICET, ESGIC) -40 mg per tablet Take 1 tablet by mouth every 4 (four) hours as needed for Pain. 18 tablet 3/10/2023 3/13/2023 Antony Bolanos Jr., MILEY          Follow-up Information       Follow up With Specialties Details Why Contact Info    Alexus Lambert MD Family Medicine In 3 days  [address] [facility] [address]  [phone]      [facility] Dept Emergency Medicine In 3 days As needed, If symptoms worsen [address] [facility] [id number] [phone]             MILEY Crabtree Jr.  03/10/23 9632

## 2023-04-24 ENCOUNTER — HOSPITAL ENCOUNTER (EMERGENCY)
Facility: HOSPITAL | Age: 23
Discharge: HOME | End: 2023-04-25
Attending: EMERGENCY MEDICINE
Payer: MEDICAID

## 2023-04-24 VITALS
WEIGHT: 155.63 LBS | BODY MASS INDEX: 24.43 KG/M2 | SYSTOLIC BLOOD PRESSURE: 113 MMHG | RESPIRATION RATE: 18 BRPM | HEART RATE: 80 BPM | OXYGEN SATURATION: 99 % | HEIGHT: 67 IN | TEMPERATURE: 98 F | DIASTOLIC BLOOD PRESSURE: 75 MMHG

## 2023-04-24 DIAGNOSIS — J02.9 VIRAL PHARYNGITIS: Primary | ICD-10-CM

## 2023-04-24 LAB
B-HCG UR QL: NEGATIVE
CTP QC/QA: YES

## 2023-04-24 PROCEDURE — 87651 STREP A DNA AMP PROBE: CPT | Performed by: PHYSICIAN ASSISTANT

## 2023-04-24 PROCEDURE — 99283 EMERGENCY DEPT VISIT LOW MDM: CPT

## 2023-04-24 PROCEDURE — 0240U COVID/FLU A&B PCR: CPT | Performed by: PHYSICIAN ASSISTANT

## 2023-04-24 PROCEDURE — 81025 URINE PREGNANCY TEST: CPT | Performed by: PHYSICIAN ASSISTANT

## 2023-04-24 NOTE — Clinical Note
"Mely Ochoameenakshi Hardy was seen and treated in our emergency department on 4/24/2023.  She may return to work on 04/27/2023.       If you have any questions or concerns, please don't hesitate to call.      Julisa Duggan PA-C"

## 2023-04-25 RX ORDER — CETIRIZINE HYDROCHLORIDE 10 MG/1
10 TABLET ORAL DAILY
Qty: 14 TABLET | Refills: 0 | OUTPATIENT
Start: 2023-04-25 | End: 2023-07-29

## 2023-04-25 RX ORDER — FLUTICASONE PROPIONATE 50 MCG
1 SPRAY, SUSPENSION (ML) NASAL 2 TIMES DAILY PRN
Qty: 15 G | Refills: 0 | Status: SHIPPED | OUTPATIENT
Start: 2023-04-25

## 2023-04-25 NOTE — ED PROVIDER NOTES
Encounter Date: 4/24/2023       History     Chief Complaint   Patient presents with    Sore Throat     Sore throat, HA, ear ache, weakness, and cough that started yesterday, increase pain today.       Mely Hardy is a 23 y.o. female who presents to the ED complaining of a headache, sore throat, and ear pain x 2 days. Reports non-productive cough as well. Throat pain increased today so she decided to come in for evaluation. She denies sick contacts, fevers, chills, SOB, difficulty swallowing, N/V/D.    The history is provided by the patient. No  was used.   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 Prolactin levels remain low (about 5) in the absence of dopamine agonist treatment since the surgery.    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     Past Surgical History:   Procedure Laterality Date    ADENOIDECTOMY      SURGICAL REMOVAL OF PITUITARY TUMOR BY TRANSSPHENOIDAL APPROACH N/A 1/3/2020    Procedure: RESECTION, NEOPLASM, PITUITARY, TRANSSPHENOIDAL APPROACH ETSR /c navigation in joint procedure /c ENT;  Surgeon: Dino Llamas MD;  Location: [facility] MAIN OR;  Service: ENT;  Laterality: N/A;    TONSILLECTOMY      TONSILLECTOMY AND ADENOIDECTOMY      TYMPANOSTOMY TUBE PLACEMENT       Family History   Problem Relation Age of Onset    No Known Problems Mother     No Known Problems Father     No Known Problems Sister     No Known Problems Brother     No Known Problems Maternal Aunt     No Known Problems Maternal Uncle     No Known Problems Paternal Aunt     No Known Problems Paternal Uncle     Cancer Maternal Grandmother     Hypertension Maternal Grandmother     No Known Problems Maternal Grandfather     No Known Problems  Paternal Grandmother     No Known Problems Paternal Grandfather     Aneurysm Neg Hx     Clotting disorder Neg Hx     Dementia Neg Hx     Diabetes Neg Hx     Fainting Neg Hx     Heart disease Neg Hx     Hyperlipidemia Neg Hx     Kidney disease Neg Hx     Liver disease Neg Hx     Migraines Neg Hx     Neuropathy Neg Hx     Obesity Neg Hx     Parkinsonism Neg Hx     Seizures Neg Hx     Stroke Neg Hx     Tremor Neg Hx      Social History     Tobacco Use    Smoking status: Never    Smokeless tobacco: Never   Substance Use Topics    Alcohol use: Never    Drug use: Never     Review of Systems   Constitutional:  Negative for chills and fever.   HENT:  Positive for sore throat. Negative for congestion.    Eyes:  Negative for redness and itching.   Respiratory:  Positive for cough. Negative for shortness of breath.    Cardiovascular:  Negative for chest pain and leg swelling.   Gastrointestinal:  Negative for abdominal pain, nausea and vomiting.   Genitourinary:  Negative for dysuria and flank pain.   Musculoskeletal:  Negative for arthralgias and back pain.   Skin:  Negative for rash and wound.   Neurological:  Positive for headaches. Negative for weakness.   Hematological:  Does not bruise/bleed easily.     Physical Exam     Initial Vitals [04/24/23 2102]   BP Pulse Resp Temp SpO2   113/75 80 18 97.5 °F (36.4 °C) 99 %      MAP       --         Physical Exam    Nursing note and vitals reviewed.  Constitutional: She appears well-developed and well-nourished. No distress.   HENT:   Head: Normocephalic and atraumatic.   Right Ear: External ear normal.   Left Ear: External ear normal.   Mouth/Throat: Oropharynx is clear and moist. No oropharyngeal exudate.   Eyes: Conjunctivae and EOM are normal. No scleral icterus.   Neck: Neck supple.   Normal range of motion.  Cardiovascular:  Normal rate and regular rhythm.           No murmur heard.  Pulmonary/Chest: Breath sounds normal. No respiratory distress. She has no wheezes.    Abdominal: Abdomen is soft. Bowel sounds are normal. She exhibits no distension. There is no abdominal tenderness.   Musculoskeletal:         General: No tenderness. Normal range of motion.      Cervical back: Normal range of motion and neck supple.     Neurological: She is alert and oriented to person, place, and time. No cranial nerve deficit.   Skin: Skin is warm and dry. Capillary refill takes less than 2 seconds. No erythema.   Psychiatric: She has a normal mood and affect. Her behavior is normal. Judgment and thought content normal.       ED Course   Procedures  Labs Reviewed   STREP GROUP A BY PCR - Normal    Narrative:     The Xpert Xpress Strep A test is a rapid, qualitative in vitro diagnostic test for the detection of Streptococcus pyogenes (Group A ß-hemolytic Streptococcus, Strep A) in throat swab specimens from patients with signs and symptoms of pharyngitis.     COVID/FLU A&B PCR - Normal    Narrative:     The Xpert Xpress SARS-CoV-2/FLU/RSV plus is a rapid, multiplexed real-time PCR test intended for the simultaneous qualitative detection and differentiation of SARS-CoV-2, Influenza A, Influenza B, and respiratory syncytial virus (RSV) viral RNA in either nasopharyngeal swab or nasal swab specimens.         POCT URINE PREGNANCY          Imaging Results    None          Medications - No data to display  Medical Decision Making:   Initial Assessment:   Resting comfortably in NAD. HDS and afebrile  Differential Diagnosis:   Viral URI, covid, flu, strep, viral pharyngitis  Clinical Tests:   Lab Tests: Ordered and Reviewed  ED Management:  Covid, flu, strep negative. Suspect viral pharyngitis. Will prescribe claritin and flonase. Magic mouthwash prn for throat pain. Encouraged close follow up with PCP. ED return precautions given. Pt verbalized understanding. All questions answered.            ED Course as of 04/25/23 0139   Tue Apr 25, 2023   0122 STREP A PCR ([facility]): Not Detected [KD]   0122 Influenza  A, Molecular: Not Detected [KD]   0122 SARS-CoV2 (COVID-19) Qualitative PCR: Not Detected [KD]      ED Course User Index  [KD] Julisa Duggan PA-C                 Clinical Impression:   Final diagnoses:  [J02.9] Viral pharyngitis (Primary)        ED Disposition Condition    Discharge Stable          ED Prescriptions       Medication Sig Dispense Start Date End Date Auth. Provider    cetirizine (ZYRTEC) 10 MG tablet Take 1 tablet (10 mg total) by mouth once daily. for 14 days 14 tablet 4/25/2023 5/9/2023 Julisa Duggan PA-C    fluticasone propionate (FLONASE) 50 mcg/actuation nasal spray 1 spray (50 mcg total) by Each Nostril route 2 (two) times daily as needed for Rhinitis. 15 g 4/25/2023 -- Julisa Duggan PA-C    (Magic mouthwash) 1:1:1 diphenhydrAMINE(Benadryl) 12.5mg/5ml liq, aluminum & magnesium hydroxide-simethicone (Maalox), LIDOcaine viscous 2% Swish and spit 5 mLs every 4 (four) hours as needed (sore throat). 90 mL 4/25/2023 -- Julisa Duggan PA-C          Follow-up Information       Follow up With Specialties Details Why Contact Info    Alexus Lambert MD Family Medicine In 3 days Hospital follow up [address] [facility] [address]  [phone]      [facility] Emergency Medicine  If symptoms worsen [address] [address] [phone]             Julisa Duggan PA-C  04/25/23 0136

## 2023-05-09 ENCOUNTER — OFFICE VISIT (OUTPATIENT)
Dept: FAMILY MEDICINE | Facility: CLINIC | Age: 23
End: 2023-05-09
Payer: MEDICAID

## 2023-05-09 VITALS
HEIGHT: 67 IN | OXYGEN SATURATION: 100 % | BODY MASS INDEX: 24.33 KG/M2 | HEART RATE: 61 BPM | SYSTOLIC BLOOD PRESSURE: 92 MMHG | DIASTOLIC BLOOD PRESSURE: 62 MMHG | RESPIRATION RATE: 18 BRPM | WEIGHT: 155 LBS | TEMPERATURE: 98 F

## 2023-05-09 DIAGNOSIS — E03.8 ACQUIRED CENTRAL HYPOTHYROIDISM: ICD-10-CM

## 2023-05-09 DIAGNOSIS — R91.1 RIGHT LOWER LOBE PULMONARY NODULE: Primary | ICD-10-CM

## 2023-05-09 DIAGNOSIS — R91.1 SOLITARY PULMONARY NODULE: ICD-10-CM

## 2023-05-09 DIAGNOSIS — D35.2 PITUITARY ADENOMA: ICD-10-CM

## 2023-05-09 DIAGNOSIS — E23.0: ICD-10-CM

## 2023-05-09 PROCEDURE — 90471 IMMUNIZATION ADMIN: CPT | Mod: PBBFAC

## 2023-05-09 PROCEDURE — 90715 TDAP VACCINE 7 YRS/> IM: CPT | Mod: PBBFAC

## 2023-05-09 PROCEDURE — 99215 OFFICE O/P EST HI 40 MIN: CPT | Mod: PBBFAC

## 2023-05-09 RX ADMIN — TETANUS TOXOID, REDUCED DIPHTHERIA TOXOID AND ACELLULAR PERTUSSIS VACCINE, ADSORBED 0.5 ML: 5; 2.5; 8; 8; 2.5 SUSPENSION INTRAMUSCULAR at 04:05

## 2023-05-09 NOTE — LETTER
May 9, 2023    Mely Hardy  [address]  [place] [address]             [facility]  Family Medicine  [address] [address]  Phone: [phone]   May 9, 2023     Patient: Mely Hardy   YOB: 2000   Date of Visit: 5/9/2023       To Whom it May Concern:    Mely Hardy was seen in my clinic on 5/9/2023. She may return to work on 05/09/2023.    Please excuse her from any work missed.    If you have any questions or concerns, please don't hesitate to call.    Sincerely,         Alberto Dahl LPN

## 2023-05-09 NOTE — PROGRESS NOTES
Patient ID: Mely Hardy is a 23 y.o. female.    Chief Complaint:    HPI  24 yo F with hx of pituitary adenoma is here to follow-up,    Acute issues: None. Feeling good. No SOB, chest pain or hemoptysis. No family hx of lung cancer. But reports family hx of ovarian and breast cancer in Grandmother. Reviewed CT abdomen which showed 4 mm right lower lobe lung nodule, pt agreed to proceed with CT chest. Pt denies breast discharge or change in vision. Previous MRI brain reviewed showed no recurrence of pituitary mass.     Chronic issues reviewed today:  Panhypopituitarism: recent MRI pituitary w w/o contrast showed no evidence of residual or recurrent lesion seen.  Primary amenorrhea: Sprintec daily, pt reports not too compliant. But also not sexually active.  Diabetes insipidus- Desmopressin 0.1 mg tablet  Hypothyroidism acquired- Synthroid 125 mcg tablet before breakfast  ACTH deficiency- Cortef 5 mg tablet 3 times daily     Healthcare maintenance  Pap smear due- declined   Depression screen- denies depression  Routine labs reviewed  STD screening - declined  Offer tetanus vaccine- pt agreed to it    Care team  Panhypopituitarism: follows neurosurgery and endocrine in [place]     Review of Systems  See above     Objective   Vitals:    05/09/23 1543   BP: 92/62   Pulse: 61   Resp: 18   Temp: 98.1 °F (36.7 °C)     Physical Exam  Gen gale: NAD  HEENT: EOMI, MMM  Neck: No neck mass, no adenopathy  CV: RRR, no mrg  Resp: Clr breath sounds b/l  Abdomen: Soft, NTND  Extremities: No edema  Skin: No rash      Assessment and Plan     Problem List Items Addressed This Visit          Endocrine    Pituitary adenoma    Acquired central hypothyroidism    Deficiency of adrenocorticotropic hormone (ACTH)     Other Visit Diagnoses       Right lower lobe pulmonary nodule    -  Primary            - Need for tdap vaccine - to be given today  - CT chest without contrast to be ordered- future order for surveillance of lung nodule   -  Continue current medication regimen  - Reviewed MRI brain- no recurrence of pituitary mass/adenoma   - Recommend continue to follow-up with [facility]   - Pt declined STD testing and pap smear    RTC 3 months

## 2023-05-10 NOTE — PROGRESS NOTES
I reviewed History, PE, A/P and chart was reviewed.  Services provided in the outpatient department of  a teaching facility, I was immediately available.  I agree with resident, care reasonable and necessary.   Management discussed with resident at time of visit.    Alice Odonnell MD  [facility] - [facility]  [facility]

## 2023-05-23 ENCOUNTER — HOSPITAL ENCOUNTER (OUTPATIENT)
Dept: RADIOLOGY | Facility: HOSPITAL | Age: 23
Discharge: HOME | End: 2023-05-23
Attending: STUDENT IN AN ORGANIZED HEALTH CARE EDUCATION/TRAINING PROGRAM
Payer: MEDICAID

## 2023-05-23 DIAGNOSIS — R91.1 SOLITARY PULMONARY NODULE: ICD-10-CM

## 2023-05-23 PROCEDURE — 71250 CT THORAX DX C-: CPT | Mod: TC

## 2023-05-26 ENCOUNTER — TELEPHONE (OUTPATIENT)
Dept: FAMILY MEDICINE | Facility: CLINIC | Age: 23
End: 2023-05-26
Payer: MEDICAID

## 2023-05-26 NOTE — TELEPHONE ENCOUNTER
05/26/23    CT chest without contrast result report discussed with the patient.   Multiple areas of punctate scattered nodularity seen. Will follow-up with CT chest surveillance in 6-12 months. Please order and follow-up closely.    GILLIAN        Please call with CT results [phone].  Thank you.

## 2023-06-13 ENCOUNTER — PATIENT MESSAGE (OUTPATIENT)
Dept: RESEARCH | Facility: HOSPITAL | Age: 23
End: 2023-06-13
Payer: MEDICAID

## 2023-06-20 ENCOUNTER — PATIENT MESSAGE (OUTPATIENT)
Dept: RESEARCH | Facility: HOSPITAL | Age: 23
End: 2023-06-20
Payer: MEDICAID

## 2023-06-27 ENCOUNTER — PATIENT MESSAGE (OUTPATIENT)
Dept: RESEARCH | Facility: HOSPITAL | Age: 23
End: 2023-06-27
Payer: MEDICAID

## 2023-07-05 ENCOUNTER — PATIENT MESSAGE (OUTPATIENT)
Dept: RESEARCH | Facility: HOSPITAL | Age: 23
End: 2023-07-05
Payer: MEDICAID

## 2023-07-10 ENCOUNTER — PATIENT MESSAGE (OUTPATIENT)
Dept: ADMINISTRATIVE | Facility: HOSPITAL | Age: 23
End: 2023-07-10
Payer: MEDICAID

## 2023-07-11 ENCOUNTER — PATIENT MESSAGE (OUTPATIENT)
Dept: RESEARCH | Facility: HOSPITAL | Age: 23
End: 2023-07-11
Payer: MEDICAID

## 2023-07-29 ENCOUNTER — HOSPITAL ENCOUNTER (EMERGENCY)
Facility: HOSPITAL | Age: 23
Discharge: HOME | End: 2023-07-29
Attending: INTERNAL MEDICINE
Payer: MEDICAID

## 2023-07-29 VITALS
BODY MASS INDEX: 26.3 KG/M2 | DIASTOLIC BLOOD PRESSURE: 68 MMHG | HEIGHT: 67 IN | HEART RATE: 78 BPM | TEMPERATURE: 98 F | SYSTOLIC BLOOD PRESSURE: 106 MMHG | RESPIRATION RATE: 18 BRPM | WEIGHT: 167.56 LBS | OXYGEN SATURATION: 99 %

## 2023-07-29 DIAGNOSIS — R52 BODY ACHES: ICD-10-CM

## 2023-07-29 DIAGNOSIS — J06.9 UPPER RESPIRATORY TRACT INFECTION, UNSPECIFIED TYPE: ICD-10-CM

## 2023-07-29 DIAGNOSIS — R51.9 SINUS HEADACHE: Primary | ICD-10-CM

## 2023-07-29 LAB
APPEARANCE UR: CLEAR
BACTERIA #/AREA URNS AUTO: ABNORMAL /HPF
BILIRUB UR QL STRIP.AUTO: NEGATIVE
COLOR UR: COLORLESS
FLUAV AG UPPER RESP QL IA.RAPID: NOT DETECTED
FLUBV AG UPPER RESP QL IA.RAPID: NOT DETECTED
GLUCOSE UR QL STRIP.AUTO: NORMAL
HYALINE CASTS #/AREA URNS LPF: ABNORMAL /LPF
KETONES UR QL STRIP.AUTO: NEGATIVE
LEUKOCYTE ESTERASE UR QL STRIP.AUTO: NEGATIVE
NITRITE UR QL STRIP.AUTO: NEGATIVE
PH UR STRIP.AUTO: 6 [PH]
PROT UR QL STRIP.AUTO: NEGATIVE
RBC #/AREA URNS AUTO: ABNORMAL /HPF
RBC UR QL AUTO: NEGATIVE
SARS-COV-2 RNA RESP QL NAA+PROBE: NOT DETECTED
SP GR UR STRIP.AUTO: 1
SQUAMOUS #/AREA URNS LPF: ABNORMAL /HPF
UROBILINOGEN UR STRIP-ACNC: NORMAL
WBC #/AREA URNS AUTO: ABNORMAL /HPF

## 2023-07-29 PROCEDURE — 99284 EMERGENCY DEPT VISIT MOD MDM: CPT

## 2023-07-29 PROCEDURE — 0240U COVID/FLU A&B PCR: CPT | Performed by: NURSE PRACTITIONER

## 2023-07-29 PROCEDURE — 81001 URINALYSIS AUTO W/SCOPE: CPT | Performed by: NURSE PRACTITIONER

## 2023-07-29 RX ORDER — BACLOFEN 10 MG/1
10 TABLET ORAL 3 TIMES DAILY PRN
Qty: 21 TABLET | Refills: 0 | Status: SHIPPED | OUTPATIENT
Start: 2023-07-29

## 2023-07-29 RX ORDER — CETIRIZINE HYDROCHLORIDE 10 MG/1
10 TABLET ORAL DAILY
Qty: 14 TABLET | Refills: 0 | Status: SHIPPED | OUTPATIENT
Start: 2023-07-29 | End: 2023-08-12

## 2023-07-29 RX ORDER — PHENAZOPYRIDINE HYDROCHLORIDE 200 MG/1
200 TABLET, FILM COATED ORAL 3 TIMES DAILY
Qty: 6 TABLET | Refills: 0 | Status: SHIPPED | OUTPATIENT
Start: 2023-07-29 | End: 2023-07-31

## 2023-07-29 NOTE — DISCHARGE INSTRUCTIONS
Increase oral fluids.  Take medication as prescribed.  Follow up with your primary care physician in 3-5 days for follow up evaluation.

## 2023-07-29 NOTE — ED PROVIDER NOTES
Encounter Date: 7/29/2023       History     Chief Complaint   Patient presents with    Fatigue    Headache     Pt reports generalized weakness, headache, nausea, and flank pain x 2 days.     Flank Pain     Pt is a 23 y.o. female who presents to the [facility] ED with her mother. Pt complaining of sinus pressure, body aches, suprapubic pain, and low back pain. Symptoms present x 2 days. Denies cough, chest pain, SOB, weakness, dizziness, vomiting, or loss of bowel or bladder control. Denies vaginal bleeding or discharge. Hx of pituitary tumor removal, ACTH deficiency.      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 Prolactin levels remain low (about 5) in the absence of dopamine agonist treatment since the surgery.    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     Past Surgical History:   Procedure Laterality Date    ADENOIDECTOMY      SURGICAL REMOVAL OF PITUITARY TUMOR BY TRANSSPHENOIDAL APPROACH N/A 1/3/2020    Procedure: RESECTION, NEOPLASM, PITUITARY, TRANSSPHENOIDAL APPROACH ETSR /c navigation in joint procedure /c ENT;  Surgeon: Dino Llamas MD;  Location: [facility] MAIN OR;  Service: ENT;  Laterality: N/A;    TONSILLECTOMY      TONSILLECTOMY AND ADENOIDECTOMY      TYMPANOSTOMY TUBE PLACEMENT       Family History   Problem Relation Age of Onset    No Known Problems Mother     No Known Problems Father     No Known Problems Sister     No Known Problems Brother     No Known Problems Maternal Aunt     No Known Problems Maternal Uncle     No Known Problems Paternal Aunt     No Known Problems Paternal Uncle     Cancer Maternal Grandmother     Hypertension Maternal Grandmother     No Known Problems Maternal Grandfather     No Known Problems Paternal  Grandmother     No Known Problems Paternal Grandfather     Aneurysm Neg Hx     Clotting disorder Neg Hx     Dementia Neg Hx     Diabetes Neg Hx     Fainting Neg Hx     Heart disease Neg Hx     Hyperlipidemia Neg Hx     Kidney disease Neg Hx     Liver disease Neg Hx     Migraines Neg Hx     Neuropathy Neg Hx     Obesity Neg Hx     Parkinsonism Neg Hx     Seizures Neg Hx     Stroke Neg Hx     Tremor Neg Hx      Social History     Tobacco Use    Smoking status: Never    Smokeless tobacco: Never   Substance Use Topics    Alcohol use: Never    Drug use: Never     Review of Systems   Constitutional:  Negative for chills, diaphoresis, fatigue and fever.   HENT:  Positive for sinus pressure and sinus pain. Negative for facial swelling, rhinorrhea, sore throat and trouble swallowing.    Respiratory:  Negative for cough, chest tightness, shortness of breath and wheezing.    Cardiovascular:  Negative for chest pain, palpitations and leg swelling.   Gastrointestinal:  Positive for abdominal pain. Negative for diarrhea, nausea and vomiting.   Genitourinary:  Negative for dysuria, flank pain, frequency, hematuria and urgency.   Musculoskeletal:  Positive for back pain. Negative for arthralgias, joint swelling and myalgias.   Skin:  Negative for color change and rash.   Neurological:  Negative for dizziness, syncope, weakness and light-headedness.   Hematological:  Does not bruise/bleed easily.   All other systems reviewed and are negative.      Physical Exam     Initial Vitals [07/29/23 1614]   BP Pulse Resp Temp SpO2   102/66 80 16 98.2 °F (36.8 °C) 99 %      MAP       --         Physical Exam    Nursing note and vitals reviewed.  Constitutional: She appears well-developed and well-nourished.   HENT:   Head: Normocephalic and atraumatic.   Nose: Mucosal edema present. Right sinus exhibits maxillary sinus tenderness. Left sinus exhibits maxillary sinus tenderness.   Mouth/Throat: Oropharynx is clear and moist.   Eyes:  Conjunctivae and EOM are normal. Pupils are equal, round, and reactive to light.   Neck: Neck supple.   Normal range of motion.  Cardiovascular:  Normal rate, regular rhythm, normal heart sounds and intact distal pulses.           Pulmonary/Chest: Effort normal and breath sounds normal. No respiratory distress. She has no wheezes. She has no rhonchi. She has no rales. She exhibits no tenderness.   Abdominal: Abdomen is soft and flat. Bowel sounds are normal. She exhibits no distension. There is abdominal tenderness in the suprapubic area. There is no rebound, no guarding, no tenderness at McBurney's point and negative Caruso's sign. negative psoas sign  Musculoskeletal:         General: Normal range of motion.      Cervical back: Normal range of motion and neck supple.     Neurological: She is alert and oriented to person, place, and time. She has normal strength and normal reflexes.   Skin: Skin is warm and dry. Capillary refill takes less than 2 seconds.   Psychiatric: She has a normal mood and affect. Her speech is normal and behavior is normal. Judgment and thought content normal.         ED Course   Procedures  Labs Reviewed   URINALYSIS, REFLEX TO URINE CULTURE - Abnormal; Notable for the following components:       Result Value    Color, UA Colorless (*)     All other components within normal limits   COVID/FLU A&B PCR - Normal    Narrative:     The Xpert Xpress SARS-CoV-2/FLU/RSV plus is a rapid, multiplexed real-time PCR test intended for the simultaneous qualitative detection and differentiation of SARS-CoV-2, Influenza A, Influenza B, and respiratory syncytial virus (RSV) viral RNA in either nasopharyngeal swab or nasal swab specimens.                Imaging Results    None          Medications - No data to display  Medical Decision Making:   Differential Diagnosis:   URI  COVID  UTI    Clinical Tests:   Lab Tests: Ordered and Reviewed  ED Management:  5:44 PM Reassessed patient at this time. Reports  condition has improved. Discussed with patient all pertinent ED information and results. Discussed diagnosis and treatment plan with patient. Follow up instructions and return to ED instruction have been given. All questions and concerns were addressed at this time. Patient voices understanding of information and instructions. Patient is comfortable with plan and discharge. Patient is stable for discharge.                             Clinical Impression:   Final diagnoses:  [R51.9] Sinus headache (Primary)  [J06.9] Upper respiratory tract infection, unspecified type  [R52] Body aches        ED Disposition Condition    Discharge Stable          ED Prescriptions       Medication Sig Dispense Start Date End Date Auth. Provider    cetirizine (ZYRTEC) 10 MG tablet Take 1 tablet (10 mg total) by mouth once daily. for 14 days 14 tablet 7/29/2023 8/12/2023 MILEY Crabtree Jr.    baclofen (LIORESAL) 10 MG tablet Take 1 tablet (10 mg total) by mouth 3 (three) times daily as needed (muscle spasms). 21 tablet 7/29/2023 -- MILEY Crabtree Jr.    phenazopyridine (PYRIDIUM) 200 MG tablet Take 1 tablet (200 mg total) by mouth 3 (three) times daily. for 2 days 6 tablet 7/29/2023 7/31/2023 MILEY Crabtree Jr.          Follow-up Information       Follow up With Specialties Details Why Contact Info    Marcelino Weeks MD Family Medicine In 3 days  [address] [address]  [phone]      [facility] Emergency Medicine In 3 days As needed, If symptoms worsen [address] [id number] [phone]             MILEY Crabtree Jr.  07/29/23 6385

## 2023-09-26 ENCOUNTER — OFFICE VISIT (OUTPATIENT)
Dept: FAMILY MEDICINE | Facility: CLINIC | Age: 23
End: 2023-09-26
Payer: MEDICAID

## 2023-09-26 VITALS
OXYGEN SATURATION: 100 % | HEIGHT: 66 IN | TEMPERATURE: 98 F | SYSTOLIC BLOOD PRESSURE: 92 MMHG | WEIGHT: 151.81 LBS | HEART RATE: 61 BPM | DIASTOLIC BLOOD PRESSURE: 62 MMHG | BODY MASS INDEX: 24.4 KG/M2

## 2023-09-26 DIAGNOSIS — N91.0 PRIMARY AMENORRHEA: Primary | ICD-10-CM

## 2023-09-26 DIAGNOSIS — E23.2 DIABETES INSIPIDUS, NEUROHYPOPHYSEAL: ICD-10-CM

## 2023-09-26 DIAGNOSIS — E03.8 ACQUIRED CENTRAL HYPOTHYROIDISM: ICD-10-CM

## 2023-09-26 DIAGNOSIS — E89.3 HYPOPITUITARISM AFTER ADENOMA RESECTION: ICD-10-CM

## 2023-09-26 DIAGNOSIS — E03.9 ACQUIRED HYPOTHYROIDISM: ICD-10-CM

## 2023-09-26 PROCEDURE — 99214 OFFICE O/P EST MOD 30 MIN: CPT | Mod: PBBFAC | Performed by: STUDENT IN AN ORGANIZED HEALTH CARE EDUCATION/TRAINING PROGRAM

## 2023-09-26 NOTE — PROGRESS NOTES
[facility] Progress Note    ID:  Mely Hardy   MRN:  [id number]     9/26/2023    Chief Complaint:  Routine follow-up, panhypopituitarism    History of Present Illness:  Mely Hardy is a 23 y.o. female who presents to [facility] clinic for no complaints, re-establishing care with [facility] this time.    Acute Issues:  CT results- CT chest performed 5 3/23 for further evaluation of 4 mm lung nodule seen on CT abdomen pelvis in ER visit.  Notable for multiple less than 4 mm nodules.     Chronic Issues:  Hypothyroidism - Synthroid 125 mcg   Diabetes insipidus- Desmopressin 0.1 mg tablets daily  Panhypopituitarism secondary to resection of pituitary adenoma- planned repeat MRI brain February 2024 per neurosurgery  Adrenal insufficiency - Cortef 5 mg t.i.d.  Primary amenorrhea- Continues on Sprintec    Care team:    Endocrinology- Dr. Carcamo-next appointment in December   Neurosurgery- Dr. 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 Prolactin levels remain low (about 5) in the absence of dopamine agonist treatment since the surgery.    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       Past Surgical History:   Procedure Laterality Date    ADENOIDECTOMY      SURGICAL REMOVAL OF PITUITARY TUMOR BY TRANSSPHENOIDAL APPROACH N/A 1/3/2020    Procedure: RESECTION, NEOPLASM, PITUITARY,  TRANSSPHENOIDAL APPROACH ETSR /c navigation in joint procedure /c ENT;  Surgeon: Dino Llamas MD;  Location: [facility] MAIN OR;  Service: ENT;  Laterality: N/A;    TONSILLECTOMY      TONSILLECTOMY AND ADENOIDECTOMY      TYMPANOSTOMY TUBE PLACEMENT         Social History     Tobacco Use    Smoking status: Never    Smokeless tobacco: Never   Substance Use Topics    Alcohol use: Never    Drug use: Never       Family History   Problem Relation Age of Onset    No Known Problems Mother     No Known Problems Father     No Known Problems Sister     No Known Problems Brother     No Known Problems Maternal Aunt     No Known Problems Maternal Uncle     No Known Problems Paternal Aunt     No Known Problems Paternal Uncle     Cancer Maternal Grandmother     Hypertension Maternal Grandmother     No Known Problems Maternal Grandfather     No Known Problems Paternal Grandmother     No Known Problems Paternal Grandfather     Aneurysm Neg Hx     Clotting disorder Neg Hx     Dementia Neg Hx     Diabetes Neg Hx     Fainting Neg Hx     Heart disease Neg Hx     Hyperlipidemia Neg Hx     Kidney disease Neg Hx     Liver disease Neg Hx     Migraines Neg Hx     Neuropathy Neg Hx     Obesity Neg Hx     Parkinsonism Neg Hx     Seizures Neg Hx     Stroke Neg Hx     Tremor Neg Hx          Current Outpatient Medications:     (Magic mouthwash) 1:1:1 diphenhydrAMINE(Benadryl) 12.5mg/5ml liq, aluminum & magnesium hydroxide-simethicone (Maalox), LIDOcaine viscous 2%, Swish and spit 5 mLs every 4 (four) hours as needed (sore throat)., Disp: 90 mL, Rfl: 0    baclofen (LIORESAL) 10 MG tablet, Take 1 tablet (10 mg total) by mouth 3 (three) times daily as needed (muscle spasms)., Disp: 21 tablet, Rfl: 0    cetirizine (ZYRTEC) 10 MG tablet, Take 1 tablet (10 mg total) by mouth once daily. for 14 days, Disp: 14 tablet, Rfl: 0    desmopressin (DDAVP) 0.1 MG Tab, TAKE 1/2 (ONE-HALF) TABLET BY MOUTH ONCE DAILY IN THE MORNING AND 1 (WHOLE TABLET) IN THE EVENING,  "Disp: 45 tablet, Rfl: 5    fluticasone propionate (FLONASE) 50 mcg/actuation nasal spray, 1 spray (50 mcg total) by Each Nostril route 2 (two) times daily as needed for Rhinitis., Disp: 15 g, Rfl: 0    hydrocortisone (CORTEF) 5 MG Tab, Take 1 tablet (5 mg total) by mouth 3 (three) times daily. Two in the morning and one in the afternoon., Disp: 90 tablet, Rfl: 5    levothyroxine (SYNTHROID) 125 MCG tablet, Take 1 tablet (125 mcg total) by mouth before breakfast., Disp: 30 tablet, Rfl: 11    pantoprazole (PROTONIX) 40 MG tablet, Take 1 tablet (40 mg total) by mouth once daily., Disp: 30 tablet, Rfl: 0    SPRINTEC, 28, 0.25-35 mg-mcg per tablet, , Disp: , Rfl:     Review of patient's allergies indicates:  No Known Allergies      Review of Systems:  See HPI      Physical Exam:  BP 92/62 (BP Location: Right arm, Patient Position: Sitting, BP Method: Large (Automatic))   Pulse 61   Temp 98.2 °F (36.8 °C) (Oral)   Ht 5' 6" (1.676 m)   Wt 68.9 kg (151 lb 12.8 oz)   SpO2 100%   BMI 24.50 kg/m²     Gen: well appearing  HEENT: EOMI, MMM  Neck: No neck mass, no adenopathy  CV: RRR, no mrg  Resp: Clr breath sounds b/l  Abdomen: Soft, NTND  Extremities: No edema  Skin: No rash     Assessment/Plan:    Primary amenorrhea  Acquired central hypothyroidism  Diabetes insipidus, neurohypophyseal  Acquired hypothyroidism  Hypopituitarism after adenoma resection  Lung Nodules    - Discussed CT results, opts for 1 year follow-up  - Keep upcoming appointments Endocrinology, will defer labs to Endocrinology   - Discussed need for Pap smear, defers today.  Agreeable to three-month follow-up with female physician      HM:  Will schedule dedicated 3 month follow up for pap smear    Marcelino Weeks MD  [facility] Resident HO-3    "

## 2023-10-09 NOTE — PROGRESS NOTES
Faculty addendum: Patient discussed with resident on day of encounter.  Care provided reasonable and necessary. I participated in the management of the patient and was immediately available throughout the encounter. Services were furnished in a primary care center located in the outpatient department of a [facility]. I agree with the resident's findings and plan as documented in the resident's note.     Kinjal Lozada, DO

## 2023-10-16 ENCOUNTER — PATIENT MESSAGE (OUTPATIENT)
Dept: ADMINISTRATIVE | Facility: HOSPITAL | Age: 23
End: 2023-10-16
Payer: MEDICAID

## 2023-12-09 ENCOUNTER — HOSPITAL ENCOUNTER (EMERGENCY)
Facility: HOSPITAL | Age: 23
Discharge: HOME | End: 2023-12-09
Attending: EMERGENCY MEDICINE
Payer: MEDICAID

## 2023-12-09 VITALS
WEIGHT: 152.13 LBS | DIASTOLIC BLOOD PRESSURE: 54 MMHG | SYSTOLIC BLOOD PRESSURE: 93 MMHG | HEIGHT: 66 IN | RESPIRATION RATE: 20 BRPM | OXYGEN SATURATION: 100 % | HEART RATE: 86 BPM | BODY MASS INDEX: 24.45 KG/M2 | TEMPERATURE: 99 F

## 2023-12-09 DIAGNOSIS — J02.0 STREP PHARYNGITIS: Primary | ICD-10-CM

## 2023-12-09 LAB
B-HCG UR QL: NEGATIVE
CTP QC/QA: YES
FLUAV AG UPPER RESP QL IA.RAPID: NOT DETECTED
FLUBV AG UPPER RESP QL IA.RAPID: NOT DETECTED
RSV A 5' UTR RNA NPH QL NAA+PROBE: NOT DETECTED
SARS-COV-2 RNA RESP QL NAA+PROBE: NOT DETECTED
STREP A PCR (OHS): DETECTED

## 2023-12-09 PROCEDURE — 81025 URINE PREGNANCY TEST: CPT | Performed by: PHYSICIAN ASSISTANT

## 2023-12-09 PROCEDURE — 63600175 PHARM REV CODE 636 W HCPCS: Performed by: PHYSICIAN ASSISTANT

## 2023-12-09 PROCEDURE — 87651 STREP A DNA AMP PROBE: CPT | Performed by: PHYSICIAN ASSISTANT

## 2023-12-09 PROCEDURE — 25000003 PHARM REV CODE 250: Performed by: PHYSICIAN ASSISTANT

## 2023-12-09 PROCEDURE — 0241U COVID/RSV/FLU A&B PCR: CPT | Performed by: PHYSICIAN ASSISTANT

## 2023-12-09 PROCEDURE — 99284 EMERGENCY DEPT VISIT MOD MDM: CPT

## 2023-12-09 RX ORDER — AMOXICILLIN AND CLAVULANATE POTASSIUM 875; 125 MG/1; MG/1
1 TABLET, FILM COATED ORAL EVERY 12 HOURS
Qty: 20 TABLET | Refills: 0 | Status: SHIPPED | OUTPATIENT
Start: 2023-12-09 | End: 2023-12-19

## 2023-12-09 RX ORDER — ONDANSETRON 4 MG/1
4 TABLET, ORALLY DISINTEGRATING ORAL EVERY 8 HOURS PRN
Qty: 15 TABLET | Refills: 0 | Status: SHIPPED | OUTPATIENT
Start: 2023-12-09

## 2023-12-09 RX ORDER — ACETAMINOPHEN 500 MG
1000 TABLET ORAL
Status: COMPLETED | OUTPATIENT
Start: 2023-12-09 | End: 2023-12-09

## 2023-12-09 RX ORDER — ONDANSETRON 4 MG/1
4 TABLET, ORALLY DISINTEGRATING ORAL
Status: COMPLETED | OUTPATIENT
Start: 2023-12-09 | End: 2023-12-09

## 2023-12-09 RX ORDER — IBUPROFEN 400 MG/1
800 TABLET ORAL
Status: COMPLETED | OUTPATIENT
Start: 2023-12-09 | End: 2023-12-09

## 2023-12-09 RX ORDER — AMOXICILLIN AND CLAVULANATE POTASSIUM 875; 125 MG/1; MG/1
1 TABLET, FILM COATED ORAL
Status: COMPLETED | OUTPATIENT
Start: 2023-12-09 | End: 2023-12-09

## 2023-12-09 RX ADMIN — AMOXICILLIN AND CLAVULANATE POTASSIUM 1 TABLET: 875; 125 TABLET, COATED ORAL at 04:12

## 2023-12-09 RX ADMIN — IBUPROFEN 800 MG: 400 TABLET, FILM COATED ORAL at 04:12

## 2023-12-09 RX ADMIN — ONDANSETRON 4 MG: 4 TABLET, ORALLY DISINTEGRATING ORAL at 02:12

## 2023-12-09 RX ADMIN — ACETAMINOPHEN 1000 MG: 500 TABLET ORAL at 02:12

## 2023-12-09 RX ADMIN — PREDNISONE 60 MG: 50 TABLET ORAL at 03:12

## 2023-12-09 NOTE — ED PROVIDER NOTES
Encounter Date: 12/9/2023       History     Chief Complaint   Patient presents with    Fever    Headache    Nausea    Generalized Body Aches     C/o above symptoms since yesterday since yesterday     23-year-old female presents to the emergency department with complaints of fever, headache, nausea, vomiting, generalized body aches and sore throat that began yesterday.  She states she is still eating and drinking.  Temp 100.7° on exam.  No medications taken today.    The history is provided by the patient. No  was used.     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 Prolactin levels remain low (about 5) in the absence of dopamine agonist treatment since the surgery.    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     Past Surgical History:   Procedure Laterality Date    ADENOIDECTOMY      SURGICAL REMOVAL OF PITUITARY TUMOR BY TRANSSPHENOIDAL APPROACH N/A 1/3/2020    Procedure: RESECTION, NEOPLASM, PITUITARY, TRANSSPHENOIDAL APPROACH ETSR /c navigation in joint procedure /c ENT;  Surgeon: Dino Llamas MD;  Location: [facility] MAIN OR;  Service: ENT;  Laterality: N/A;    TONSILLECTOMY      TONSILLECTOMY AND ADENOIDECTOMY      TYMPANOSTOMY TUBE PLACEMENT       Family History   Problem Relation Age of Onset    No Known Problems Mother     No Known Problems Father     No Known Problems Sister     No Known Problems Brother     No Known Problems Maternal Aunt     No Known Problems Maternal Uncle     No Known Problems Paternal Aunt     No Known Problems Paternal Uncle     Cancer Maternal Grandmother     Hypertension Maternal Grandmother     No Known Problems Maternal Grandfather     No Known Problems Paternal Grandmother     No Known Problems  Paternal Grandfather     Aneurysm Neg Hx     Clotting disorder Neg Hx     Dementia Neg Hx     Diabetes Neg Hx     Fainting Neg Hx     Heart disease Neg Hx     Hyperlipidemia Neg Hx     Kidney disease Neg Hx     Liver disease Neg Hx     Migraines Neg Hx     Neuropathy Neg Hx     Obesity Neg Hx     Parkinsonism Neg Hx     Seizures Neg Hx     Stroke Neg Hx     Tremor Neg Hx      Social History     Tobacco Use    Smoking status: Never    Smokeless tobacco: Never   Substance Use Topics    Alcohol use: Never    Drug use: Never     Review of Systems   Constitutional:  Positive for fever. Negative for chills.   HENT: Negative.  Negative for sore throat.    Eyes: Negative.    Respiratory:  Negative for cough, chest tightness and shortness of breath.    Cardiovascular:  Negative for chest pain and palpitations.   Gastrointestinal:  Positive for nausea. Negative for abdominal pain, diarrhea and vomiting.   Genitourinary:  Negative for decreased urine volume and dysuria.   Musculoskeletal:  Positive for myalgias. Negative for back pain.   Skin:  Negative for rash and wound.   Neurological:  Positive for headaches. Negative for dizziness, weakness and numbness.   Hematological:  Negative for adenopathy. Does not bruise/bleed easily.       Physical Exam     Initial Vitals [12/09/23 1440]   BP Pulse Resp Temp SpO2   (!) 93/54 (!) 117 20 99 °F (37.2 °C) 100 %      MAP       --         Physical Exam    Nursing note and vitals reviewed.  Constitutional: She appears well-developed and well-nourished.   HENT:   Head: Normocephalic and atraumatic.   Right Ear: External ear normal.   Left Ear: External ear normal.   Nose: Nose normal.   Mouth/Throat: Posterior oropharyngeal erythema present.   Patient does not have tonsils   Eyes: Conjunctivae are normal.   Neck: Neck supple.   Normal range of motion.  Cardiovascular:  Normal rate, regular rhythm, normal heart sounds and intact distal pulses.           Pulmonary/Chest: Breath sounds  normal. No respiratory distress. She has no wheezes. She has no rhonchi. She has no rales. She exhibits no tenderness.   Abdominal: Abdomen is soft. Bowel sounds are normal. There is no abdominal tenderness. There is no rebound and no guarding.   Musculoskeletal:         General: Normal range of motion.      Cervical back: Normal range of motion and neck supple.     Lymphadenopathy:     She has no cervical adenopathy.   Neurological: She is alert.   Skin: Skin is warm. Capillary refill takes less than 2 seconds.         ED Course   Procedures  Labs Reviewed   STREP GROUP A BY PCR - Abnormal; Notable for the following components:       Result Value    STREP A PCR ([facility]) Detected (*)     All other components within normal limits    Narrative:     The Xpert Xpress Strep A test is a rapid, qualitative in vitro diagnostic test for the detection of Streptococcus pyogenes (Group A ß-hemolytic Streptococcus, Strep A) in throat swab specimens from patients with signs and symptoms of pharyngitis.     COVID/RSV/FLU A&B PCR - Normal    Narrative:     The Xpert Xpress SARS-CoV-2/FLU/RSV plus is a rapid, multiplexed real-time PCR test intended for the simultaneous qualitative detection and differentiation of SARS-CoV-2, Influenza A, Influenza B, and respiratory syncytial virus (RSV) viral RNA in either nasopharyngeal swab or nasal swab specimens.         POCT URINE PREGNANCY          Imaging Results    None          Medications   acetaminophen tablet 1,000 mg (1,000 mg Oral Given 12/9/23 1452)   ondansetron disintegrating tablet 4 mg (4 mg Oral Given 12/9/23 1452)   predniSONE tablet 60 mg (60 mg Oral Given 12/9/23 1519)   ibuprofen tablet 800 mg (800 mg Oral Given 12/9/23 1640)   amoxicillin-clavulanate 875-125mg per tablet 1 tablet (1 tablet Oral Given 12/9/23 1644)     Medical Decision Making  23-year-old female presents to the emergency department with complaints of fever, headache, nausea, vomiting, generalized body aches and  sore throat that began yesterday.  She states she is still eating and drinking.    DDx:  COVID, influenza, RSV, strep pharyngitis    Strep +.  Ibuprofen, prednisone, Tylenol and Augmentin give in the ED.  COVID, influenza and RSV negative.      Amount and/or Complexity of Data Reviewed  Labs: ordered. Decision-making details documented in ED Course.    Risk  OTC drugs.  Prescription drug management.               ED Course as of 12/09/23 1645   Sat Dec 09, 2023   1600 STREP A PCR ([facility])(!): Detected [ER]   1631 Influenza A, Molecular: Not Detected [ER]   1631 Influenza B, Molecular: Not Detected [ER]   1631 RSV Ag by Molecular Method: Not Detected [ER]   1631 SARS-CoV2 (COVID-19) Qualitative PCR: Not Detected [ER]      ED Course User Index  [ER] Gissell Crump PA                           Clinical Impression:  Final diagnoses:  [J02.0] Strep pharyngitis (Primary)          ED Disposition Condition    Discharge Stable          ED Prescriptions       Medication Sig Dispense Start Date End Date Auth. Provider    amoxicillin-clavulanate 875-125mg (AUGMENTIN) 875-125 mg per tablet Take 1 tablet by mouth every 12 (twelve) hours. for 10 days 20 tablet 12/9/2023 12/19/2023 Gissell Crump PA    ondansetron (ZOFRAN-ODT) 4 MG TbDL Take 1 tablet (4 mg total) by mouth every 8 (eight) hours as needed (nausea). 15 tablet 12/9/2023 -- Gissell Crump PA          Follow-up Information       Follow up With Specialties Details Why Contact Info [facility] Emergency Medicine  As needed, If symptoms worsen [address] [facility] [place] [id number] [phone]    Marcelino Weeks MD Family Medicine Schedule an appointment as soon as possible for a visit in 2 days  [address] [facility] [address]  [phone]               Gissell Crump PA  12/09/23 1376

## 2023-12-09 NOTE — DISCHARGE INSTRUCTIONS
Take medications as prescribed with food and water.  Stay well hydrated drinking plenty of water daily.   Alternate Tylenol and Ibuprofen for body aches and fever.  Follow up with your primary care provider within 2-3 days.

## 2023-12-09 NOTE — Clinical Note
"Mely Ochoameenakshi Hardy was seen and treated in our emergency department on 12/9/2023.  She may return to work on 12/14/2023.       If you have any questions or concerns, please don't hesitate to call.      Gissell Crump PA"

## 2024-01-03 ENCOUNTER — OFFICE VISIT (OUTPATIENT)
Dept: FAMILY MEDICINE | Facility: CLINIC | Age: 24
End: 2024-01-03
Payer: MEDICAID

## 2024-01-03 VITALS
RESPIRATION RATE: 16 BRPM | DIASTOLIC BLOOD PRESSURE: 63 MMHG | OXYGEN SATURATION: 96 % | SYSTOLIC BLOOD PRESSURE: 93 MMHG | HEART RATE: 60 BPM | HEIGHT: 66 IN | BODY MASS INDEX: 24.91 KG/M2 | TEMPERATURE: 99 F | WEIGHT: 155 LBS

## 2024-01-03 DIAGNOSIS — L20.89 FLEXURAL ATOPIC DERMATITIS: Primary | ICD-10-CM

## 2024-01-03 PROCEDURE — 99214 OFFICE O/P EST MOD 30 MIN: CPT | Mod: PBBFAC | Performed by: STUDENT IN AN ORGANIZED HEALTH CARE EDUCATION/TRAINING PROGRAM

## 2024-01-03 RX ORDER — HYDROCORTISONE 25 MG/G
OINTMENT TOPICAL 2 TIMES DAILY
Qty: 20 G | Refills: 5 | Status: SHIPPED | OUTPATIENT
Start: 2024-01-03 | End: 2024-04-02

## 2024-01-03 NOTE — PROGRESS NOTES
[facility] Progress Note    ID:  Mely Hardy   MRN:  [id number]     1/3/2024    Chief Complaint:  Pap smear, skin problem    History of Present Illness:  Mely Hardy is a 23 y.o. female who presents to [facility] clinic, scheduled for Pap smear.     Acute Issues:  Skin problem- patches of dry, pruritic skin between bilateral 1st interdigital creases and flexion creases of bilateral arms as well as upper back.  No previous diagnosis of atopic diseases, denies history of asthma or hayfever, or childhood atopic dermatitis diagnosis.  This condition is recurrent, worsened during winter time, sensitivity to fragrances and scented soaps.  Often wash his hands with hot water and takes hot showers experiencing itchiness after each occurrence.      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  Primary amenorrhea- Continues on Sprintec    Care team:    Endocrinology- Dr. Carcamo- last appointment 12/21/23-denies any medication changes   Neurosurgery- Dr. 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 Prolactin levels remain low (about 5) in the absence of dopamine  agonist treatment since the surgery.    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       Past Surgical History:   Procedure Laterality Date    ADENOIDECTOMY      SURGICAL REMOVAL OF PITUITARY TUMOR BY TRANSSPHENOIDAL APPROACH N/A 1/3/2020    Procedure: RESECTION, NEOPLASM, PITUITARY, TRANSSPHENOIDAL APPROACH ETSR /c navigation in joint procedure /c ENT;  Surgeon: Dino Llamas MD;  Location: [facility] MAIN OR;  Service: ENT;  Laterality: N/A;    TONSILLECTOMY      TONSILLECTOMY AND ADENOIDECTOMY      TYMPANOSTOMY TUBE PLACEMENT         Social History     Tobacco Use    Smoking status: Never    Smokeless tobacco: Never   Substance Use Topics    Alcohol use: Never    Drug use: Never       Family History   Problem Relation Age of Onset    No Known Problems Mother     No Known Problems Father     No Known Problems Sister     No Known Problems Brother     No Known Problems Maternal Aunt     No Known Problems Maternal Uncle     No Known Problems Paternal Aunt     No Known Problems Paternal Uncle     Cancer Maternal Grandmother     Hypertension Maternal Grandmother     No Known Problems Maternal Grandfather     No Known Problems Paternal Grandmother     No Known Problems Paternal Grandfather     Aneurysm Neg Hx     Clotting disorder Neg Hx     Dementia Neg Hx     Diabetes Neg Hx     Fainting Neg Hx     Heart disease Neg Hx     Hyperlipidemia Neg Hx     Kidney disease Neg Hx     Liver disease Neg Hx     Migraines Neg Hx     Neuropathy Neg Hx     Obesity Neg Hx     Parkinsonism Neg Hx     Seizures Neg Hx     Stroke Neg Hx     Tremor Neg Hx          Current Outpatient Medications:     (Magic mouthwash) 1:1:1 diphenhydrAMINE(Benadryl) 12.5mg/5ml liq, aluminum & magnesium hydroxide-simethicone (Maalox), LIDOcaine viscous 2%, Swish and spit 5 mLs every 4 (four) hours as needed (sore throat)., Disp: 90 mL, Rfl: 0    baclofen (LIORESAL) 10 MG tablet, Take 1 tablet (10 mg total) by mouth 3 (three) times daily as needed (muscle spasms)., Disp: 21 tablet,  "Rfl: 0    cetirizine (ZYRTEC) 10 MG tablet, Take 1 tablet (10 mg total) by mouth once daily. for 14 days, Disp: 14 tablet, Rfl: 0    desmopressin (DDAVP) 0.1 MG Tab, TAKE 1/2 (ONE-HALF) TABLET BY MOUTH ONCE DAILY IN THE MORNING AND 1 (WHOLE TABLET) IN THE EVENING, Disp: 45 tablet, Rfl: 5    fluticasone propionate (FLONASE) 50 mcg/actuation nasal spray, 1 spray (50 mcg total) by Each Nostril route 2 (two) times daily as needed for Rhinitis., Disp: 15 g, Rfl: 0    HYDROcodone-acetaminophen (NORCO) 5-325 mg per tablet, Take 1 tablet by mouth., Disp: , Rfl:     hydrocortisone 2.5 % ointment, Apply topically 2 (two) times daily., Disp: 20 g, Rfl: 5    levothyroxine (SYNTHROID) 125 MCG tablet, Take 1 tablet (125 mcg total) by mouth before breakfast., Disp: 30 tablet, Rfl: 11    ondansetron (ZOFRAN-ODT) 4 MG TbDL, Take 1 tablet (4 mg total) by mouth every 8 (eight) hours as needed (nausea)., Disp: 15 tablet, Rfl: 0    pantoprazole (PROTONIX) 40 MG tablet, Take 1 tablet (40 mg total) by mouth once daily., Disp: 30 tablet, Rfl: 0    SPRINTEC, 28, 0.25-35 mg-mcg per tablet, , Disp: , Rfl:     Review of patient's allergies indicates:  No Known Allergies      Review of Systems:  See HPI      Physical Exam:  BP 93/63 (BP Location: Right arm, Patient Position: Sitting, BP Method: Large (Automatic))   Pulse 60   Temp 98.7 °F (37.1 °C) (Oral)   Resp 16   Ht 5' 6" (1.676 m)   Wt 70.3 kg (155 lb)   SpO2 96%   BMI 25.02 kg/m²     Gen: well appearing  HEENT: EOMI, MMM  Neck: No neck mass, no adenopathy  CV: RRR, no mrg  Resp: Clr breath sounds b/l  Abdomen: Soft, NTND  Extremities: No edema  Skin:  1 cm thin scaly patches bilateral 1st interdigital web spaces, scattered patches on upper back and flexor forearms.    Assessment/Plan:    Flexeril atopic dermatitis-mild  - thoroughly discussed skin care routine, hydration, avoidance of fragrances, chemical irritants, hot water  - prescribed hydrocortisone 2.5% ointment to apply to " affected areas    HM:  Follow-up in 3 months we discussed Pap smear and reassess atopic dermatitis    Marcelino Weeks MD  [employer]  Resident -3

## 2024-04-12 ENCOUNTER — OFFICE VISIT (OUTPATIENT)
Dept: FAMILY MEDICINE | Facility: CLINIC | Age: 24
End: 2024-04-12
Payer: MEDICAID

## 2024-04-12 VITALS
RESPIRATION RATE: 16 BRPM | HEIGHT: 66 IN | WEIGHT: 158 LBS | DIASTOLIC BLOOD PRESSURE: 64 MMHG | SYSTOLIC BLOOD PRESSURE: 96 MMHG | HEART RATE: 77 BPM | TEMPERATURE: 98 F | OXYGEN SATURATION: 99 % | BODY MASS INDEX: 25.39 KG/M2

## 2024-04-12 DIAGNOSIS — J30.1 SEASONAL ALLERGIC RHINITIS DUE TO POLLEN: ICD-10-CM

## 2024-04-12 DIAGNOSIS — L20.89 FLEXURAL ATOPIC DERMATITIS: ICD-10-CM

## 2024-04-12 DIAGNOSIS — H10.10 ALLERGIC CONJUNCTIVITIS, UNSPECIFIED LATERALITY: Primary | ICD-10-CM

## 2024-04-12 PROCEDURE — 99214 OFFICE O/P EST MOD 30 MIN: CPT | Mod: PBBFAC | Performed by: STUDENT IN AN ORGANIZED HEALTH CARE EDUCATION/TRAINING PROGRAM

## 2024-04-12 RX ORDER — HYDROCORTISONE 25 MG/G
OINTMENT TOPICAL 2 TIMES DAILY
Qty: 20 G | Refills: 2 | Status: SHIPPED | OUTPATIENT
Start: 2024-04-12 | End: 2024-07-11

## 2024-04-12 RX ORDER — FLUTICASONE PROPIONATE 50 MCG
1 SPRAY, SUSPENSION (ML) NASAL DAILY
Qty: 16 G | Refills: 2 | Status: SHIPPED | OUTPATIENT
Start: 2024-04-12

## 2024-04-12 RX ORDER — LEVOCETIRIZINE DIHYDROCHLORIDE 5 MG/1
5 TABLET, FILM COATED ORAL NIGHTLY
Qty: 30 TABLET | Refills: 11 | Status: SHIPPED | OUTPATIENT
Start: 2024-04-12 | End: 2025-04-12

## 2024-04-12 NOTE — PROGRESS NOTES
"SUBJECTIVE:  Mely Hardy is a 24 y.o. female here accompanied by mother for Follow-up (3 month follow up.) and Eye Drainage (Onset a week ago)    One-week history bilateral ocular swelling, clear discharge, itching, runny nose and postnasal drip symptoms.  Denies visual changes, cough, sore throat, sick contacts, fever, chills, nausea, vomiting, diarrhea.  Known history of allergies.  Previously prescribed cetirizine does not taken in some time.    Skin rash-ongoing, multiple patches of dry itchy skin were abdomen, interdigital spaces, flexor crease of wrists and elbows.  Has not applied medication.         Review of Systems   See HPI    OBJECTIVE:  Vital signs  Vitals:    04/12/24 1525   BP: 96/64   BP Location: Right arm   Patient Position: Sitting   BP Method: Medium (Automatic)   Pulse: 77   Resp: 16   Temp: 97.5 °F (36.4 °C)   TempSrc: Oral   SpO2: 99%   Weight: 71.7 kg (158 lb)   Height: 5' 6" (1.676 m)      Physical exam   General-well-appearing, NAD   HEENT-bilateral conjunctival injection, scant clear discharge, nares patent without rhinorrhea, pale nasal turbinates.  Oropharynx clear without oropharyngeal erythema or edema.  No palpable cervical lymphadenopathy or thyromegaly.  Skin-multiple subcentimeter dark scaly patches located right lower abdomen, interdigital spaces bilateral hands flexor creases of arms and wrists      ASSESSMENT/PLAN:  1. Allergic conjunctivitis bilateral  2. Seasonal allergic rhinitis due to pollen  - persistent allergic symptoms   - start Xyzal 5 mg Q nightly, fluticasone nasal spray once daily  - discussed allergic testing, deferred at this time   - instructed use hypoallergenic products, pillow cover    3. Flexural atopic dermatitis  -     hydrocortisone 2.5 % ointment; Apply topically 2 (two) times daily.  Dispense: 20 g; Refill: 2        Follow Up:  Follow up in about 6 weeks (around 5/24/2024) for eczema, Pap smear.        "

## 2024-04-18 NOTE — PROGRESS NOTES
I have discussed the case with the resident and reviewed the resident's history and physical, assessment, plan, and progress note. I agree with the findings.       Himanshu Craft MD  [facility]

## 2024-05-14 ENCOUNTER — OFFICE VISIT (OUTPATIENT)
Dept: FAMILY MEDICINE | Facility: CLINIC | Age: 24
End: 2024-05-14
Payer: MEDICAID

## 2024-05-14 VITALS
BODY MASS INDEX: 25.9 KG/M2 | TEMPERATURE: 98 F | SYSTOLIC BLOOD PRESSURE: 101 MMHG | DIASTOLIC BLOOD PRESSURE: 67 MMHG | WEIGHT: 161.19 LBS | HEART RATE: 94 BPM | HEIGHT: 66 IN | OXYGEN SATURATION: 100 %

## 2024-05-14 DIAGNOSIS — A05.9 FOOD POISONING: ICD-10-CM

## 2024-05-14 DIAGNOSIS — E53.8 FOLATE DEFICIENCY: ICD-10-CM

## 2024-05-14 DIAGNOSIS — D64.9 ANEMIA, UNSPECIFIED TYPE: ICD-10-CM

## 2024-05-14 DIAGNOSIS — Z30.9 ENCOUNTER FOR CONTRACEPTIVE MANAGEMENT, UNSPECIFIED TYPE: ICD-10-CM

## 2024-05-14 DIAGNOSIS — Z09 HOSPITAL DISCHARGE FOLLOW-UP: Primary | ICD-10-CM

## 2024-05-14 LAB
ABS NEUT CALC (OHS): 6.7 X10(3)/MCL (ref 2.1–9.2)
EOSINOPHIL NFR BLD MANUAL: 0.34 X10(3)/MCL (ref 0–0.9)
EOSINOPHIL NFR BLD MANUAL: 3 % (ref 0–8)
ERYTHROCYTE [DISTWIDTH] IN BLOOD BY AUTOMATED COUNT: 12.7 % (ref 11.5–17)
FERRITIN SERPL-MCNC: 129.09 NG/ML (ref 4.63–204)
FOLATE SERPL-MCNC: 3.5 NG/ML (ref 7–31.4)
HCT VFR BLD AUTO: 31.8 % (ref 37–47)
HGB BLD-MCNC: 10.3 G/DL (ref 12–16)
IRON SATN MFR SERPL: 21 % (ref 20–50)
IRON SERPL-MCNC: 50 UG/DL (ref 50–170)
LYMPHOCYTES NFR BLD MANUAL: 3.91 X10(3)/MCL
LYMPHOCYTES NFR BLD MANUAL: 35 % (ref 13–40)
MCH RBC QN AUTO: 28.9 PG (ref 27–31)
MCHC RBC AUTO-ENTMCNC: 32.4 G/DL (ref 33–36)
MCV RBC AUTO: 89.3 FL (ref 80–94)
METAMYELOCYTES NFR BLD MANUAL: 1 %
MONOCYTES NFR BLD MANUAL: 0.11 X10(3)/MCL (ref 0.1–1.3)
MONOCYTES NFR BLD MANUAL: 1 % (ref 2–11)
NEUTROPHILS NFR BLD MANUAL: 60 % (ref 47–80)
NRBC BLD AUTO-RTO: 0 %
PLATELET # BLD AUTO: 315 X10(3)/MCL (ref 130–400)
PLATELET # BLD EST: NORMAL 10*3/UL
PMV BLD AUTO: 11.3 FL (ref 7.4–10.4)
RBC # BLD AUTO: 3.56 X10(6)/MCL (ref 4.2–5.4)
RBC MORPH BLD: NORMAL
TIBC SERPL-MCNC: 185 UG/DL (ref 70–310)
TIBC SERPL-MCNC: 235 UG/DL (ref 250–450)
TRANSFERRIN SERPL-MCNC: 208 MG/DL (ref 180–382)
VIT B12 SERPL-MCNC: 902 PG/ML (ref 213–816)
WBC # SPEC AUTO: 11.17 X10(3)/MCL (ref 4.5–11.5)

## 2024-05-14 PROCEDURE — 83540 ASSAY OF IRON: CPT | Performed by: STUDENT IN AN ORGANIZED HEALTH CARE EDUCATION/TRAINING PROGRAM

## 2024-05-14 PROCEDURE — 82728 ASSAY OF FERRITIN: CPT | Performed by: STUDENT IN AN ORGANIZED HEALTH CARE EDUCATION/TRAINING PROGRAM

## 2024-05-14 PROCEDURE — 85007 BL SMEAR W/DIFF WBC COUNT: CPT | Performed by: STUDENT IN AN ORGANIZED HEALTH CARE EDUCATION/TRAINING PROGRAM

## 2024-05-14 PROCEDURE — 99214 OFFICE O/P EST MOD 30 MIN: CPT | Mod: PBBFAC | Performed by: STUDENT IN AN ORGANIZED HEALTH CARE EDUCATION/TRAINING PROGRAM

## 2024-05-14 PROCEDURE — 82607 VITAMIN B-12: CPT | Performed by: STUDENT IN AN ORGANIZED HEALTH CARE EDUCATION/TRAINING PROGRAM

## 2024-05-14 PROCEDURE — 85027 COMPLETE CBC AUTOMATED: CPT | Performed by: STUDENT IN AN ORGANIZED HEALTH CARE EDUCATION/TRAINING PROGRAM

## 2024-05-14 PROCEDURE — 36415 COLL VENOUS BLD VENIPUNCTURE: CPT | Performed by: STUDENT IN AN ORGANIZED HEALTH CARE EDUCATION/TRAINING PROGRAM

## 2024-05-14 PROCEDURE — 82746 ASSAY OF FOLIC ACID SERUM: CPT | Performed by: STUDENT IN AN ORGANIZED HEALTH CARE EDUCATION/TRAINING PROGRAM

## 2024-05-14 RX ORDER — NORGESTIMATE AND ETHINYL ESTRADIOL 0.25-0.035
1 KIT ORAL DAILY
Qty: 30 TABLET | Refills: 11 | Status: SHIPPED | OUTPATIENT
Start: 2024-05-14 | End: 2025-05-14

## 2024-05-14 RX ORDER — FOLIC ACID 1 MG/1
1 TABLET ORAL DAILY
Qty: 360 TABLET | Refills: 0 | Status: SHIPPED | OUTPATIENT
Start: 2024-05-14 | End: 2025-05-14

## 2024-05-14 NOTE — PROGRESS NOTES
[facility] FM Clinic Progress Note    ID:  Mely Hardy   MRN:  [id number]     5/14/2024    Chief Complaint:  Hospital follow-up    History of Present Illness:  Mely Hardy is a 24 y.o. female who presents to [facility] FM clinic for     Hospital [id number] to [facility] in [facility] on 05/05/24 and discharged 05/07 due to gastroenteritis, thought to be related to ingestion of fried chicken 3 days prior.  She had 2-3 days of severe vomiting and diarrhea meeting severe sepsis criteria.  Ultimately, blood cultures were negative.  Symptoms likely exacerbated by her history adrenal insufficiency and inconsistent steroid use.  She was discharged on p.o. Cipro, Flagyl for which she has been compliant.  She feels overall well today.    Dysmenorrhea/contraception management  - taking Sprintec, never sexually active regular mild periods.  Wishes for med refill    Anemia, normocytic   - no bleeding sources, not previously on medical supplementation.  Notable decline in H/H from previous lab.  Denies weakness, dizziness, PICA symptoms.      Current Outpatient Medications:     (Magic mouthwash) 1:1:1 diphenhydrAMINE(Benadryl) 12.5mg/5ml liq, aluminum & magnesium hydroxide-simethicone (Maalox), LIDOcaine viscous 2%, Swish and spit 5 mLs every 4 (four) hours as needed (sore throat)., Disp: 90 mL, Rfl: 0    baclofen (LIORESAL) 10 MG tablet, Take 1 tablet (10 mg total) by mouth 3 (three) times daily as needed (muscle spasms)., Disp: 21 tablet, Rfl: 0    desmopressin (DDAVP) 0.1 MG Tab, TAKE 1/2 (ONE-HALF) TABLET BY MOUTH ONCE DAILY IN THE MORNING AND 1 (WHOLE TABLET) IN THE EVENING, Disp: 45 tablet, Rfl: 5    fluticasone propionate (FLONASE) 50 mcg/actuation nasal spray, 1 spray (50 mcg total) by Each Nostril route 2 (two) times daily as needed for Rhinitis., Disp: 15 g, Rfl: 0    fluticasone propionate (FLONASE) 50 mcg/actuation nasal spray, 1 spray (50 mcg total) by Each Nostril route once daily., Disp:  "16 g, Rfl: 2    HYDROcodone-acetaminophen (NORCO) 5-325 mg per tablet, Take 1 tablet by mouth., Disp: , Rfl:     hydrocortisone (CORTEF) 10 MG Tab, Take one or two tabs daily for up to three days for illness without fever or vomiting, Disp: 10 tablet, Rfl: 5    hydrocortisone (CORTEF) 5 MG Tab, Take two tablets in the morning daily and one tablet after lunch., Disp: 90 tablet, Rfl: 11    hydrocortisone 2.5 % ointment, Apply topically 2 (two) times daily., Disp: 20 g, Rfl: 2    levocetirizine (XYZAL) 5 MG tablet, Take 1 tablet (5 mg total) by mouth every evening., Disp: 30 tablet, Rfl: 11    levothyroxine (SYNTHROID) 125 MCG tablet, Take 1 tablet (125 mcg total) by mouth before breakfast., Disp: 30 tablet, Rfl: 11    ondansetron (ZOFRAN-ODT) 4 MG TbDL, Take 1 tablet (4 mg total) by mouth every 8 (eight) hours as needed (nausea)., Disp: 15 tablet, Rfl: 0    pantoprazole (PROTONIX) 40 MG tablet, Take 1 tablet (40 mg total) by mouth once daily., Disp: 30 tablet, Rfl: 0    SPRINTEC, 28, 0.25-35 mg-mcg per tablet, Take 1 tablet by mouth once daily., Disp: 30 tablet, Rfl: 11    Review of patient's allergies indicates:  No Known Allergies      Review of Systems:  See HPI      Physical Exam:  /67 (BP Location: Right arm, Patient Position: Sitting, BP Method: Medium (Automatic))   Pulse 94   Temp 98 °F (36.7 °C) (Oral)   Ht 5' 6" (1.676 m)   Wt 73.1 kg (161 lb 3.2 oz)   SpO2 100%   BMI 26.02 kg/m²     Gen-well-appearing adult female, NAD  HEENT-nonicteric sclera, mucous membranes moist  CV-regular rate and rhythm  Resp-breathing nonlabored  GI-abdomen flat  Neuro-alert and oriented  Skin-bruising bilateral antecubital fossa      Assessment/Plan:    Food poisoning  Hospital discharge follow-up  - stable  - Continue    Anemia, unspecified type  - asymptomatic  - CBC Auto Differential  - Iron and TIBC  - Vitamin B12  - Folate  - Ferritin    Encounter for contraceptive management, unspecified type  - refill meds  - " SPRINTEC, 28, 0.25-35 mg-mcg per tablet; Take 1 tablet by mouth once daily.  Dispense: 30 tablet; Refill: 11        Marcelino Weeks MD  [facility] Resident HO3

## 2024-05-14 NOTE — LETTER
May 14, 2024      [facility]  [facility] [facility] [address]  Phone: [phone]       Patient: Mely Hardy   YOB: 2000  Date of Visit: 05/14/2024    To Whom It May Concern:    Xu Hardy  was at [facility] on 05/09/24 and was under care x 5days . The patient may return to work/school on 05/15/2024 with no restrictions. If you have any questions or concerns, or if I can be of further assistance, please do not hesitate to contact me.    Sincerely,    Carmela Stein MA

## 2024-05-15 NOTE — PROGRESS NOTES
I have reviewed the Resident's history and physical, assessment, plan, and progress note. I agree with the findings.       Himanshu Craft MD  [facility]

## 2024-05-24 ENCOUNTER — OFFICE VISIT (OUTPATIENT)
Dept: FAMILY MEDICINE | Facility: CLINIC | Age: 24
End: 2024-05-24
Payer: MEDICAID

## 2024-05-24 VITALS
TEMPERATURE: 98 F | HEART RATE: 68 BPM | WEIGHT: 163.63 LBS | HEIGHT: 66 IN | SYSTOLIC BLOOD PRESSURE: 100 MMHG | BODY MASS INDEX: 26.3 KG/M2 | DIASTOLIC BLOOD PRESSURE: 61 MMHG | OXYGEN SATURATION: 100 %

## 2024-05-24 DIAGNOSIS — L20.89 FLEXURAL ATOPIC DERMATITIS: Primary | ICD-10-CM

## 2024-05-24 PROCEDURE — 99215 OFFICE O/P EST HI 40 MIN: CPT | Mod: PBBFAC | Performed by: STUDENT IN AN ORGANIZED HEALTH CARE EDUCATION/TRAINING PROGRAM

## 2024-05-24 RX ORDER — TRIAMCINOLONE ACETONIDE 1 MG/G
OINTMENT TOPICAL 2 TIMES DAILY
Qty: 80 G | Refills: 2 | Status: SHIPPED | OUTPATIENT
Start: 2024-05-24 | End: 2024-08-22

## 2024-05-24 NOTE — PROGRESS NOTES
[facility] FM Clinic Progress Note    ID:  Mely Hardy   MRN:  [id number]     5/24/2024    Chief Complaint:  Eczema follow up    History of Present Illness:  Mely Hardy is a 24 y.o. female who presents to [facility] FM clinic for follow up of atopic dermatitis.  Last seen 6 weeks prior, prescribed hydrocortisone 2.5% cream with reported mild improvement.  Cocoa butter lotion 1-2 times per day for hydration.  Itching has improved.          Current Outpatient Medications:     (Magic mouthwash) 1:1:1 diphenhydrAMINE(Benadryl) 12.5mg/5ml liq, aluminum & magnesium hydroxide-simethicone (Maalox), LIDOcaine viscous 2%, Swish and spit 5 mLs every 4 (four) hours as needed (sore throat)., Disp: 90 mL, Rfl: 0    baclofen (LIORESAL) 10 MG tablet, Take 1 tablet (10 mg total) by mouth 3 (three) times daily as needed (muscle spasms)., Disp: 21 tablet, Rfl: 0    desmopressin (DDAVP) 0.1 MG Tab, TAKE 1/2 (ONE-HALF) TABLET BY MOUTH ONCE DAILY IN THE MORNING AND 1 (WHOLE TABLET) IN THE EVENING, Disp: 45 tablet, Rfl: 5    fluticasone propionate (FLONASE) 50 mcg/actuation nasal spray, 1 spray (50 mcg total) by Each Nostril route 2 (two) times daily as needed for Rhinitis., Disp: 15 g, Rfl: 0    fluticasone propionate (FLONASE) 50 mcg/actuation nasal spray, 1 spray (50 mcg total) by Each Nostril route once daily., Disp: 16 g, Rfl: 2    folic acid (FOLVITE) 1 MG tablet, Take 1 tablet (1 mg total) by mouth once daily., Disp: 360 tablet, Rfl: 0    HYDROcodone-acetaminophen (NORCO) 5-325 mg per tablet, Take 1 tablet by mouth., Disp: , Rfl:     hydrocortisone (CORTEF) 10 MG Tab, Take one or two tabs daily for up to three days for illness without fever or vomiting, Disp: 10 tablet, Rfl: 5    hydrocortisone (CORTEF) 5 MG Tab, Take two tablets in the morning daily and one tablet after lunch., Disp: 90 tablet, Rfl: 11    hydrocortisone 2.5 % ointment, Apply topically 2 (two) times daily., Disp: 20 g, Rfl: 2    levocetirizine (XYZAL) 5 MG tablet, Take 1  "tablet (5 mg total) by mouth every evening., Disp: 30 tablet, Rfl: 11    levothyroxine (SYNTHROID) 125 MCG tablet, Take 1 tablet (125 mcg total) by mouth before breakfast., Disp: 30 tablet, Rfl: 11    ondansetron (ZOFRAN-ODT) 4 MG TbDL, Take 1 tablet (4 mg total) by mouth every 8 (eight) hours as needed (nausea)., Disp: 15 tablet, Rfl: 0    pantoprazole (PROTONIX) 40 MG tablet, Take 1 tablet (40 mg total) by mouth once daily., Disp: 30 tablet, Rfl: 0    SPRINTEC, 28, 0.25-35 mg-mcg per tablet, Take 1 tablet by mouth once daily., Disp: 30 tablet, Rfl: 11    Review of patient's allergies indicates:  No Known Allergies      Review of Systems:  See HPI      Physical Exam:  /61 (BP Location: Right arm, Patient Position: Sitting, BP Method: Medium (Automatic))   Pulse 68   Temp 98.1 °F (36.7 °C) (Oral)   Ht 5' 6" (1.676 m)   Wt 74.2 kg (163 lb 9.6 oz)   SpO2 100%   BMI 26.41 kg/m²     Gen-well-appearing adult female, NAD  HEENT-nonicteric sclera, mucous membranes moist  CV-regular rate and rhythm  Resp-breathing nonlabored  GI-abdomen flat  Neuro-alert and oriented  Skin-dry, scaly, mildly erythematous skin inter web spaces of the hand and small patch right radial wrist      Assessment/Plan:    1. Flexural atopic dermatitis  - mild improvement  - discussed adequate hydration, exposure to warm water  - start triamcinolone 0.1% ointment b.i.d. to affected areas, at least twice daily use of CeraVe hydrating lotion    Marcelino Weeks MD  [employer]  Resident HO3  "

## 2024-08-13 ENCOUNTER — HOSPITAL ENCOUNTER (EMERGENCY)
Facility: HOSPITAL | Age: 24
Discharge: HOME | End: 2024-08-14
Attending: INTERNAL MEDICINE
Payer: MEDICAID

## 2024-08-13 DIAGNOSIS — R11.0 NAUSEA: ICD-10-CM

## 2024-08-13 DIAGNOSIS — R42 DIZZINESS: Primary | ICD-10-CM

## 2024-08-13 LAB
ALBUMIN SERPL-MCNC: 3.7 G/DL (ref 3.5–5)
ALBUMIN/GLOB SERPL: 1.2 RATIO (ref 1.1–2)
ALP SERPL-CCNC: 83 UNIT/L (ref 40–150)
ALT SERPL-CCNC: 17 UNIT/L (ref 0–55)
ANION GAP SERPL CALC-SCNC: 8 MEQ/L
AST SERPL-CCNC: 24 UNIT/L (ref 5–34)
B-HCG UR QL: NEGATIVE
BILIRUB SERPL-MCNC: 0.3 MG/DL
BUN SERPL-MCNC: 11.1 MG/DL (ref 7–18.7)
CALCIUM SERPL-MCNC: 10.4 MG/DL (ref 8.4–10.2)
CHLORIDE SERPL-SCNC: 107 MMOL/L (ref 98–107)
CO2 SERPL-SCNC: 26 MMOL/L (ref 22–29)
CREAT SERPL-MCNC: 0.84 MG/DL (ref 0.55–1.02)
CREAT/UREA NIT SERPL: 13
CTP QC/QA: YES
GFR SERPLBLD CREATININE-BSD FMLA CKD-EPI: >60 ML/MIN/1.73/M2
GLOBULIN SER-MCNC: 3.1 GM/DL (ref 2.4–3.5)
GLUCOSE SERPL-MCNC: 96 MG/DL (ref 74–100)
POTASSIUM SERPL-SCNC: 4.2 MMOL/L (ref 3.5–5.1)
PROT SERPL-MCNC: 6.8 GM/DL (ref 6.4–8.3)
SODIUM SERPL-SCNC: 141 MMOL/L (ref 136–145)
T4 FREE SERPL-MCNC: 0.58 NG/DL (ref 0.7–1.48)
TSH SERPL-ACNC: 0.08 UIU/ML (ref 0.35–4.94)

## 2024-08-13 PROCEDURE — 84439 ASSAY OF FREE THYROXINE: CPT

## 2024-08-13 PROCEDURE — 80053 COMPREHEN METABOLIC PANEL: CPT

## 2024-08-13 PROCEDURE — 96372 THER/PROPH/DIAG INJ SC/IM: CPT | Mod: 59

## 2024-08-13 PROCEDURE — 84443 ASSAY THYROID STIM HORMONE: CPT

## 2024-08-13 PROCEDURE — 63600175 PHARM REV CODE 636 W HCPCS

## 2024-08-13 PROCEDURE — 99284 EMERGENCY DEPT VISIT MOD MDM: CPT | Mod: 25

## 2024-08-13 PROCEDURE — 96360 HYDRATION IV INFUSION INIT: CPT

## 2024-08-13 PROCEDURE — 25000003 PHARM REV CODE 250: Performed by: INTERNAL MEDICINE

## 2024-08-13 PROCEDURE — 81025 URINE PREGNANCY TEST: CPT | Performed by: INTERNAL MEDICINE

## 2024-08-13 RX ORDER — PROMETHAZINE HYDROCHLORIDE 25 MG/ML
25 INJECTION, SOLUTION INTRAMUSCULAR; INTRAVENOUS
Status: COMPLETED | OUTPATIENT
Start: 2024-08-13 | End: 2024-08-13

## 2024-08-13 RX ADMIN — PROMETHAZINE HYDROCHLORIDE 25 MG: 25 INJECTION INTRAMUSCULAR; INTRAVENOUS at 11:08

## 2024-08-13 RX ADMIN — SODIUM CHLORIDE 1000 ML: 9 INJECTION, SOLUTION INTRAVENOUS at 11:08

## 2024-08-14 VITALS
WEIGHT: 163.25 LBS | BODY MASS INDEX: 26.24 KG/M2 | TEMPERATURE: 99 F | HEIGHT: 66 IN | SYSTOLIC BLOOD PRESSURE: 105 MMHG | OXYGEN SATURATION: 100 % | DIASTOLIC BLOOD PRESSURE: 61 MMHG | HEART RATE: 61 BPM | RESPIRATION RATE: 17 BRPM

## 2024-08-14 LAB
HOLD SPECIMEN: NORMAL

## 2024-08-14 NOTE — ED PROVIDER NOTES
Encounter Date: 8/13/2024       History     Chief Complaint   Patient presents with    Dizziness    Headache    Nausea     Patient had  pituitary  tumor  removal  in 2020     Mely Hardy is a 23 yo female with PMH of prolactinoma s/p adenoidectomy, acquired hypothyroidism, diabetes insipidus, ACTH deficiency and primary amenorrhea who presents to the ED today with complaints of dizziness and nausea. She reports these as having started 4 hours ago when she felt a spinning sensation in her head, lightheadedness and weakness. Pt mentions that dizziness is aggravatedby sitting or standing up and alleviated by lying down. Denies any fevers, chills, SOB, cough, vomiting, diarrhea, constipation, muscle cramps, body aches, hot or cold intolerance, tremors or any other symptoms.     The history is provided by the patient. No  was used.     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 Prolactin levels remain low (about 5) in the absence of dopamine agonist treatment since the surgery.    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     Past Surgical History:   Procedure Laterality Date    ADENOIDECTOMY      SURGICAL REMOVAL OF PITUITARY TUMOR BY TRANSSPHENOIDAL APPROACH N/A 1/3/2020    Procedure: RESECTION, NEOPLASM, PITUITARY, TRANSSPHENOIDAL APPROACH ETSR /c navigation in joint procedure /c ENT;  Surgeon: Dino Llamas MD;  Location: [facility] MAIN OR;  Service: ENT;  Laterality: N/A;    TONSILLECTOMY      TONSILLECTOMY AND ADENOIDECTOMY      TYMPANOSTOMY TUBE PLACEMENT       Family History   Problem Relation Name Age of Onset    No Known Problems Mother      No Known Problems Father      No Known Problems Sister      No Known Problems Brother       No Known Problems Maternal Aunt      No Known Problems Maternal Uncle      No Known Problems Paternal Aunt      No Known Problems Paternal Uncle      Cancer Maternal Grandmother      Hypertension Maternal Grandmother      No Known Problems Maternal Grandfather      No Known Problems Paternal Grandmother      No Known Problems Paternal Grandfather      Aneurysm Neg Hx      Clotting disorder Neg Hx      Dementia Neg Hx      Diabetes Neg Hx      Fainting Neg Hx      Heart disease Neg Hx      Hyperlipidemia Neg Hx      Kidney disease Neg Hx      Liver disease Neg Hx      Migraines Neg Hx      Neuropathy Neg Hx      Obesity Neg Hx      Parkinsonism Neg Hx      Seizures Neg Hx      Stroke Neg Hx      Tremor Neg Hx       Social History     Tobacco Use    Smoking status: Never    Smokeless tobacco: Never   Substance Use Topics    Alcohol use: Never    Drug use: Never     Review of Systems   Constitutional:  Positive for fatigue. Negative for activity change, appetite change, chills and fever.   HENT:  Negative for ear discharge, ear pain and sore throat.    Eyes:  Negative for pain and discharge.   Respiratory:  Negative for cough, chest tightness and shortness of breath.    Cardiovascular:  Negative for chest pain, palpitations and leg swelling.   Gastrointestinal:  Positive for nausea. Negative for abdominal pain, constipation, diarrhea and vomiting.   Endocrine: Negative for cold intolerance and heat intolerance.   Genitourinary:  Negative for difficulty urinating, dysuria, frequency and urgency.   Musculoskeletal:  Negative for arthralgias and myalgias.   Neurological:  Positive for dizziness, weakness and light-headedness. Negative for syncope and headaches.   Hematological:  Does not bruise/bleed easily.   Psychiatric/Behavioral:  Negative for agitation, behavioral problems and confusion.        Physical Exam     Initial Vitals [08/13/24 2123]   BP Pulse Resp Temp SpO2   99/62 81 20 98.6 °F (37 °C) 100 %      MAP        --         Physical Exam    Constitutional: She appears well-developed and well-nourished. She is not diaphoretic. No distress.   HENT:   Head: Normocephalic and atraumatic.   Eyes: Conjunctivae and EOM are normal.   Neck:   Normal range of motion.  Cardiovascular:  Normal rate, regular rhythm, normal heart sounds and intact distal pulses.           Pulmonary/Chest: Breath sounds normal. No respiratory distress. She has no wheezes.   Abdominal: Abdomen is soft. Bowel sounds are normal. She exhibits no distension.   Musculoskeletal:         General: No tenderness or edema. Normal range of motion.      Cervical back: Normal range of motion.     Neurological: She is alert and oriented to person, place, and time.   HINTS negative   Skin: Skin is warm and dry.   Psychiatric: She has a normal mood and affect. Her behavior is normal. Thought content normal.         ED Course   Procedures  Labs Reviewed   TSH - Abnormal       Result Value    TSH 0.079 (*)    COMPREHENSIVE METABOLIC PANEL - Abnormal    Sodium 141      Potassium 4.2      Chloride 107      CO2 26      Glucose 96      Blood Urea Nitrogen 11.1      Creatinine 0.84      Calcium 10.4 (*)     Protein Total 6.8      Albumin 3.7      Globulin 3.1      Albumin/Globulin Ratio 1.2      Bilirubin Total 0.3      ALP 83      ALT 17      AST 24      eGFR >60      Anion Gap 8.0      BUN/Creatinine Ratio 13     T4, FREE - Abnormal    Thyroxine Free 0.58 (*)    EXTRA TUBES    Narrative:     The following orders were created for panel order EXTRA TUBES.  Procedure                               Abnormality         Status                     ---------                               -----------         ------                     Light Blue Top Hold[[id number]]                             Final result               Lavender Top Hold[[id number]]                               Final result               Gold Top Hold[[id number]]                                   Final result                Pink Top Hold[[id number]]                                   Final result                 Please view results for these tests on the individual orders.   LIGHT BLUE TOP HOLD    Extra Tube Hold for add-ons.     LAVENDER TOP HOLD    Extra Tube Hold for add-ons.     GOLD TOP HOLD    Extra Tube Hold for add-ons.     PINK TOP HOLD    Extra Tube Hold for add-ons.     POCT URINE PREGNANCY    POC Preg Test, Ur Negative       Acceptable Yes            Imaging Results    None          Medications   promethazine injection 25 mg (25 mg Intramuscular Given 8/13/24 6351)   sodium chloride 0.9% bolus 1,000 mL 1,000 mL (0 mLs Intravenous Stopped 8/14/24 0057)     Medical Decision Making  Amount and/or Complexity of Data Reviewed  Labs: ordered.    Risk  Prescription drug management.              Attending Attestation:   Physician Attestation Statement for Resident:  As the supervising MD   Physician Attestation Statement: I have personally seen and examined this patient.   I agree with the above history.  -:   As the supervising MD I agree with the above PE.     As the supervising MD I agree with the above treatment, course, plan, and disposition.    I have reviewed and agree with the residents interpretation of the following: lab data.                                        Clinical Impression:  Final diagnoses:  [R42] Dizziness (Primary)  [R11.0] Nausea          ED Disposition Condition    Discharge Stable          ED Prescriptions    None       Follow-up Information       Follow up With Specialties Details Why Contact Info    Yuli Maldonado DO Family Medicine In 2 weeks  [address] [facility] [id number] [phone]      Milind Carcamo MD Endocrinology, Internal Medicine In 2 weeks  [address] [address] [address] [phone]               Enedina Sterling MD  Resident  08/14/24 0009       Loenardo James MD  08/14/24 0713

## 2024-08-26 ENCOUNTER — OFFICE VISIT (OUTPATIENT)
Dept: FAMILY MEDICINE | Facility: CLINIC | Age: 24
End: 2024-08-26
Payer: MEDICAID

## 2024-08-26 VITALS
WEIGHT: 167.19 LBS | BODY MASS INDEX: 26.87 KG/M2 | SYSTOLIC BLOOD PRESSURE: 99 MMHG | RESPIRATION RATE: 18 BRPM | HEIGHT: 66 IN | TEMPERATURE: 99 F | OXYGEN SATURATION: 98 % | DIASTOLIC BLOOD PRESSURE: 66 MMHG

## 2024-08-26 DIAGNOSIS — L30.1 DYSHIDROTIC ECZEMA: Primary | ICD-10-CM

## 2024-08-26 PROCEDURE — 99214 OFFICE O/P EST MOD 30 MIN: CPT | Mod: PBBFAC

## 2024-08-26 NOTE — PROGRESS NOTES
"[facility] Family Medicine Clinic Note    Subjective:     Patient ID: Mely Hardy is a 24 y.o. female    Chief Complaint:   Chief Complaint   Patient presents with    Follow-up     Routine 3 mo follow up: eczema. Hospital follow up.       HPI  Mely Hardy is a 24 y.o. female who presents for follow-up. Hx of prolactinoma s/p adenoidectomy, aquired hypothyroidism, diabetes insipidus, ACTH def, and primary amenorrhea. Follows with endocrine     Eczema: Started on triamcinolone 0.1% ointment BID and CeraVe hydration. Has dry hands, worse between digits. Has Aquaphor at home. Has not been using medications consistently. No other significant lesions elsewhere.     Review of Systems  As per [facility]    Objective:         5/24/2024     2:59 PM 8/13/2024     9:23 PM 8/26/2024     3:42 PM   Vitals - 1 value per visit   SYSTOLIC 100 99 99   DIASTOLIC 61 62 66   Pulse 68 81    Temp 98.1 °F (36.7 °C) 98.6 °F (37 °C) 98.7 °F (37.1 °C)   Resp  20 18   SPO2 100 % 100 % 98 %   Weight (lb) 163.6 163.25 167.2   Weight (kg) 74.208 74.05 75.841   Height 5' 6" (1.676 m) 5' 6" (1.676 m) 5' 6" (1.676 m)   BMI (Calculated) 26.4 26.4 27   Pain Score   Zero     Physical Exam  Constitutional:       General: She is not in acute distress.     Appearance: She is not toxic-appearing.   Cardiovascular:      Rate and Rhythm: Normal rate and regular rhythm.   Skin:     General: Skin is warm and dry.      Comments: Dry skin and bilateral palms, worst in creases between 1st and 2nd digit.          Assessment & Plan     Dyshidrotic Eczema  - Recommend using her triamcinolone ointment  - Can use aquaphor   - Recommend placing medication on hand, putting on glove, then sleeping  - Will f/o in 3-6 mo    Health Maintenance  - Nervous about pap smear (never sexually active)  - Discuss at f/u visit with PCP         Health Maintenance   Topic Date Due    Hepatitis C Screening  Never done    Chlamydia Screening  Never done    Pap Smear  Never done    TETANUS VACCINE  " 05/09/2033    Lipid Panel  Completed    HPV Vaccines  Completed     No future appointments.    RTC in 3-6 mo    Eitan Guerin MD  [facility], PGY-2

## 2024-10-22 ENCOUNTER — HOSPITAL ENCOUNTER (OUTPATIENT)
Dept: RADIOLOGY | Facility: HOSPITAL | Age: 24
Discharge: HOME | End: 2024-10-22
Attending: NEUROLOGICAL SURGERY
Payer: MEDICAID

## 2024-10-22 DIAGNOSIS — D35.2 PITUITARY ADENOMA: ICD-10-CM

## 2024-10-22 LAB
B-HCG UR QL: NEGATIVE
CREAT SERPL-MCNC: 1.19 MG/DL (ref 0.55–1.02)
GFR SERPLBLD CREATININE-BSD FMLA CKD-EPI: >60 ML/MIN/1.73/M2

## 2024-10-22 PROCEDURE — 82565 ASSAY OF CREATININE: CPT | Performed by: NEUROLOGICAL SURGERY

## 2024-10-22 PROCEDURE — A9577 INJ MULTIHANCE: HCPCS

## 2024-10-22 PROCEDURE — 25500020 PHARM REV CODE 255

## 2024-10-22 PROCEDURE — 81025 URINE PREGNANCY TEST: CPT | Performed by: NEUROLOGICAL SURGERY

## 2024-10-22 PROCEDURE — 70553 MRI BRAIN STEM W/O & W/DYE: CPT | Mod: TC

## 2024-10-22 RX ADMIN — GADOBENATE DIMEGLUMINE 16 ML: 529 INJECTION, SOLUTION INTRAVENOUS at 02:10

## 2025-01-02 ENCOUNTER — HOSPITAL ENCOUNTER (EMERGENCY)
Facility: HOSPITAL | Age: 25
Discharge: HOME | End: 2025-01-02
Attending: EMERGENCY MEDICINE
Payer: MEDICAID

## 2025-01-02 VITALS
RESPIRATION RATE: 16 BRPM | HEIGHT: 66 IN | BODY MASS INDEX: 26.56 KG/M2 | OXYGEN SATURATION: 99 % | HEART RATE: 84 BPM | SYSTOLIC BLOOD PRESSURE: 104 MMHG | WEIGHT: 165.25 LBS | TEMPERATURE: 99 F | DIASTOLIC BLOOD PRESSURE: 64 MMHG

## 2025-01-02 DIAGNOSIS — R11.2 NAUSEA AND VOMITING, UNSPECIFIED VOMITING TYPE: ICD-10-CM

## 2025-01-02 DIAGNOSIS — R07.89 CHEST WALL PAIN: Primary | ICD-10-CM

## 2025-01-02 LAB
ALBUMIN SERPL-MCNC: 4.2 G/DL (ref 3.5–5)
ALBUMIN/GLOB SERPL: 1.3 RATIO (ref 1.1–2)
ALP SERPL-CCNC: 70 UNIT/L (ref 40–150)
ALT SERPL-CCNC: 13 UNIT/L (ref 0–55)
AMPHET UR QL SCN: NEGATIVE
ANION GAP SERPL CALC-SCNC: 5 MEQ/L
AST SERPL-CCNC: 23 UNIT/L (ref 5–34)
B-HCG UR QL: NEGATIVE
BACTERIA #/AREA URNS AUTO: ABNORMAL /HPF
BARBITURATE SCN PRESENT UR: NEGATIVE
BASOPHILS # BLD AUTO: 0.04 X10(3)/MCL
BASOPHILS NFR BLD AUTO: 0.5 %
BENZODIAZ UR QL SCN: NEGATIVE
BILIRUB SERPL-MCNC: 0.4 MG/DL
BILIRUB UR QL STRIP.AUTO: NEGATIVE
BUN SERPL-MCNC: 9.5 MG/DL (ref 7–18.7)
CALCIUM SERPL-MCNC: 9.7 MG/DL (ref 8.4–10.2)
CANNABINOIDS UR QL SCN: NEGATIVE
CHLORIDE SERPL-SCNC: 107 MMOL/L (ref 98–107)
CLARITY UR: CLEAR
CO2 SERPL-SCNC: 27 MMOL/L (ref 22–29)
COCAINE UR QL SCN: NEGATIVE
COLOR UR AUTO: YELLOW
CREAT SERPL-MCNC: 1.07 MG/DL (ref 0.55–1.02)
CREAT/UREA NIT SERPL: 9
CTP QC/QA: YES
EOSINOPHIL # BLD AUTO: 0.21 X10(3)/MCL (ref 0–0.9)
EOSINOPHIL NFR BLD AUTO: 2.6 %
ERYTHROCYTE [DISTWIDTH] IN BLOOD BY AUTOMATED COUNT: 12.1 % (ref 11.5–17)
FENTANYL UR QL SCN: NEGATIVE
GFR SERPLBLD CREATININE-BSD FMLA CKD-EPI: >60 ML/MIN/1.73/M2
GLOBULIN SER-MCNC: 3.3 GM/DL (ref 2.4–3.5)
GLUCOSE SERPL-MCNC: 93 MG/DL (ref 74–100)
GLUCOSE UR QL STRIP: NEGATIVE
HCT VFR BLD AUTO: 33.8 % (ref 37–47)
HGB BLD-MCNC: 11.1 G/DL (ref 12–16)
HGB UR QL STRIP: ABNORMAL
IMM GRANULOCYTES # BLD AUTO: 0.02 X10(3)/MCL (ref 0–0.04)
IMM GRANULOCYTES NFR BLD AUTO: 0.3 %
KETONES UR QL STRIP: NEGATIVE
LEUKOCYTE ESTERASE UR QL STRIP: NEGATIVE
LYMPHOCYTES # BLD AUTO: 2.41 X10(3)/MCL (ref 0.6–4.6)
LYMPHOCYTES NFR BLD AUTO: 30.2 %
MCH RBC QN AUTO: 28.3 PG (ref 27–31)
MCHC RBC AUTO-ENTMCNC: 32.8 G/DL (ref 33–36)
MCV RBC AUTO: 86.2 FL (ref 80–94)
MDMA UR QL SCN: NEGATIVE
MONOCYTES # BLD AUTO: 0.71 X10(3)/MCL (ref 0.1–1.3)
MONOCYTES NFR BLD AUTO: 8.9 %
NEUTROPHILS # BLD AUTO: 4.59 X10(3)/MCL (ref 2.1–9.2)
NEUTROPHILS NFR BLD AUTO: 57.5 %
NITRITE UR QL STRIP: NEGATIVE
NRBC BLD AUTO-RTO: 0 %
OHS QRS DURATION: 80 MS
OHS QTC CALCULATION: 462 MS
OPIATES UR QL SCN: NEGATIVE
PCP UR QL: NEGATIVE
PH UR STRIP: 5.5 [PH]
PH UR: 5.5 [PH] (ref 3–11)
PLATELET # BLD AUTO: 193 X10(3)/MCL (ref 130–400)
PLATELET # BLD EST: ADEQUATE 10*3/UL
PLATELETS.RETICULATED NFR BLD AUTO: 5.1 % (ref 0.9–11.2)
PMV BLD AUTO: 11.9 FL (ref 7.4–10.4)
POTASSIUM SERPL-SCNC: 3.8 MMOL/L (ref 3.5–5.1)
PROT SERPL-MCNC: 7.5 GM/DL (ref 6.4–8.3)
PROT UR QL STRIP: NEGATIVE
RBC # BLD AUTO: 3.92 X10(6)/MCL (ref 4.2–5.4)
RBC #/AREA URNS AUTO: ABNORMAL /HPF
RBC MORPH BLD: NORMAL
SODIUM SERPL-SCNC: 139 MMOL/L (ref 136–145)
SP GR UR STRIP.AUTO: <1.005 (ref 1–1.03)
SPECIFIC GRAVITY, URINE AUTO (.000) (OHS): <1.005 (ref 1–1.03)
SQUAMOUS #/AREA URNS LPF: ABNORMAL /HPF
TROPONIN I SERPL-MCNC: <0.01 NG/ML (ref 0–0.04)
UROBILINOGEN UR STRIP-ACNC: NORMAL
WBC # BLD AUTO: 7.98 X10(3)/MCL (ref 4.5–11.5)
WBC #/AREA URNS AUTO: ABNORMAL /HPF

## 2025-01-02 PROCEDURE — 96375 TX/PRO/DX INJ NEW DRUG ADDON: CPT

## 2025-01-02 PROCEDURE — 96374 THER/PROPH/DIAG INJ IV PUSH: CPT

## 2025-01-02 PROCEDURE — 80053 COMPREHEN METABOLIC PANEL: CPT | Performed by: NURSE PRACTITIONER

## 2025-01-02 PROCEDURE — 25000003 PHARM REV CODE 250: Performed by: NURSE PRACTITIONER

## 2025-01-02 PROCEDURE — 81025 URINE PREGNANCY TEST: CPT | Performed by: NURSE PRACTITIONER

## 2025-01-02 PROCEDURE — 84484 ASSAY OF TROPONIN QUANT: CPT | Performed by: NURSE PRACTITIONER

## 2025-01-02 PROCEDURE — 99285 EMERGENCY DEPT VISIT HI MDM: CPT | Mod: 25

## 2025-01-02 PROCEDURE — 85025 COMPLETE CBC W/AUTO DIFF WBC: CPT | Performed by: NURSE PRACTITIONER

## 2025-01-02 PROCEDURE — 81001 URINALYSIS AUTO W/SCOPE: CPT | Mod: XB | Performed by: NURSE PRACTITIONER

## 2025-01-02 PROCEDURE — 93005 ELECTROCARDIOGRAM TRACING: CPT

## 2025-01-02 PROCEDURE — 63600175 PHARM REV CODE 636 W HCPCS: Performed by: NURSE PRACTITIONER

## 2025-01-02 PROCEDURE — 80307 DRUG TEST PRSMV CHEM ANLYZR: CPT | Performed by: NURSE PRACTITIONER

## 2025-01-02 PROCEDURE — 96361 HYDRATE IV INFUSION ADD-ON: CPT

## 2025-01-02 RX ORDER — KETOROLAC TROMETHAMINE 30 MG/ML
15 INJECTION, SOLUTION INTRAMUSCULAR; INTRAVENOUS
Status: COMPLETED | OUTPATIENT
Start: 2025-01-02 | End: 2025-01-02

## 2025-01-02 RX ORDER — DIPHENHYDRAMINE HYDROCHLORIDE 50 MG/ML
25 INJECTION INTRAMUSCULAR; INTRAVENOUS
Status: COMPLETED | OUTPATIENT
Start: 2025-01-02 | End: 2025-01-02

## 2025-01-02 RX ORDER — PROCHLORPERAZINE EDISYLATE 5 MG/ML
5 INJECTION INTRAMUSCULAR; INTRAVENOUS
Status: COMPLETED | OUTPATIENT
Start: 2025-01-02 | End: 2025-01-02

## 2025-01-02 RX ADMIN — PROCHLORPERAZINE EDISYLATE 5 MG: 5 INJECTION INTRAMUSCULAR; INTRAVENOUS at 09:01

## 2025-01-02 RX ADMIN — KETOROLAC TROMETHAMINE 15 MG: 30 INJECTION, SOLUTION INTRAMUSCULAR; INTRAVENOUS at 09:01

## 2025-01-02 RX ADMIN — SODIUM CHLORIDE 1000 ML: 9 INJECTION, SOLUTION INTRAVENOUS at 09:01

## 2025-01-02 RX ADMIN — DIPHENHYDRAMINE HYDROCHLORIDE 25 MG: 50 INJECTION INTRAMUSCULAR; INTRAVENOUS at 09:01

## 2025-01-02 NOTE — Clinical Note
"Mely Ochoakenia" Antony was seen and treated in our emergency department on 1/2/2025.  She may return to work on 01/06/2025.       If you have any questions or concerns, please don't hesitate to call.      Antony Lockhart MD"

## 2025-01-02 NOTE — ED PROVIDER NOTES
"Encounter Date: 1/2/2025       History     Chief Complaint   Patient presents with    Vomiting     Chest pain that started yesterday with Vomiting, ear and back pain that started this morning. Patient actively vomiting in triage.     The patient presents with chest pain.  The onset was yesterday.  The course/duration of symptoms is episodic.  Location: right upper chest. Radiating pain: none. The character of symptoms is dull.  The degree at onset was minimal.  The degree at maximum was moderate, only with certain movements and deep breaths.  The degree at present is minimal.  There are exacerbating factors including movement and deep breathing.  The relieving factor is rest.  Risk factors consist of none.  Prior episodes: none.  Associated symptoms: vomited x1 in triage, denies abdominal pain but reports feeling "bubbly" yesterday, denies shortness of breath, cough, fever, and chills.       Review of patient's allergies indicates:  No Known Allergies  History reviewed. No pertinent past medical history.  History reviewed. No pertinent surgical history.  No family history on file.  Social History     Tobacco Use    Smoking status: Never    Smokeless tobacco: Never   Substance Use Topics    Alcohol use: Never    Drug use: Never     Review of Systems   Constitutional:  Negative for fever.   HENT:  Negative for sore throat.    Respiratory:  Negative for shortness of breath.    Cardiovascular:  Positive for chest pain.   Gastrointestinal:  Positive for nausea and vomiting.   Genitourinary:  Negative for dysuria.   Musculoskeletal:  Negative for back pain.   Skin:  Negative for rash.   Neurological:  Negative for weakness.   Hematological:  Does not bruise/bleed easily.   All other systems reviewed and are negative.      Physical Exam     Initial Vitals   BP Pulse Resp Temp SpO2   01/02/25 0805 01/02/25 0805 01/02/25 0805 01/02/25 0808 01/02/25 0805   99/65 107 16 99 °F (37.2 °C) 100 %      MAP       --            "     Physical Exam    Nursing note and vitals reviewed.  Constitutional: She appears well-developed and well-nourished.   HENT:   Head: Normocephalic and atraumatic.   Right Ear: Tympanic membrane normal.   Left Ear: Tympanic membrane normal.   Nose: Nose normal. Mouth/Throat: Uvula is midline, oropharynx is clear and moist and mucous membranes are normal.   Neck: Neck supple.   Normal range of motion.  Cardiovascular:  Normal rate, regular rhythm, normal heart sounds and intact distal pulses.           Pulmonary/Chest: Effort normal and breath sounds normal. She has no decreased breath sounds.   Ttp to right upper chest - pain is reproducible   Abdominal: Abdomen is soft and flat. Bowel sounds are normal. There is no abdominal tenderness.   Musculoskeletal:         General: Normal range of motion.      Cervical back: Normal range of motion and neck supple.     Neurological: She is alert. She has normal strength.   Skin: Skin is warm and dry.   Psychiatric: She has a normal mood and affect.         ED Course   Procedures  Labs Reviewed   COMPREHENSIVE METABOLIC PANEL - Abnormal       Result Value    Sodium 139      Potassium 3.8      Chloride 107      CO2 27      Glucose 93      Blood Urea Nitrogen 9.5      Creatinine 1.07 (*)     Calcium 9.7      Protein Total 7.5      Albumin 4.2      Globulin 3.3      Albumin/Globulin Ratio 1.3      Bilirubin Total 0.4      ALP 70      ALT 13      AST 23      eGFR >60      Anion Gap 5.0      BUN/Creatinine Ratio 9     URINALYSIS, REFLEX TO URINE CULTURE - Abnormal    Color, UA Yellow      Appearance, UA Clear      Specific Gravity, UA <1.005 (*)     pH, UA 5.5      Protein, UA Negative      Glucose, UA Negative      Ketones, UA Negative      Blood, UA 1+ (*)     Bilirubin, UA Negative      Urobilinogen, UA Normal      Nitrites, UA Negative      Leukocyte Esterase, UA Negative      RBC, UA 0-5      WBC, UA 0-5      Bacteria, UA None Seen      Squamous Epithelial Cells, UA None Seen      CBC WITH DIFFERENTIAL - Abnormal    WBC 7.98      RBC 3.92 (*)     Hgb 11.1 (*)     Hct 33.8 (*)     MCV 86.2      MCH 28.3      MCHC 32.8 (*)     RDW 12.1      Platelet 193      MPV 11.9 (*)     IPF 5.1      Neut % 57.5      Lymph % 30.2      Mono % 8.9      Eos % 2.6      Basophil % 0.5      Lymph # 2.41      Neut # 4.59      Mono # 0.71      Eos # 0.21      Baso # 0.04      IG# 0.02      IG% 0.3      NRBC% 0.0     TROPONIN I - Normal    Troponin-I <0.010     DRUG SCREEN, URINE (BEAKER) - Normal    Amphetamines, Urine Negative      Barbiturates, Urine Negative      Benzodiazepine, Urine Negative      Cannabinoids, Urine Negative      Cocaine, Urine Negative      Fentanyl, Urine Negative      MDMA, Urine Negative      Opiates, Urine Negative      Phencyclidine, Urine Negative      pH, Urine 5.5      Specific Gravity, Urine Auto <1.005      Narrative:     Cut off concentrations:    Amphetamines - 1000 ng/ml  Barbiturates - 200 ng/ml  Benzodiazepine - 200 ng/ml  Cannabinoids (THC) - 50 ng/ml  Cocaine - 300 ng/ml  Fentanyl - 1.0 ng/ml  MDMA - 500 ng/ml  Opiates - 300 ng/ml   Phencyclidine (PCP) - 25 ng/ml    Specimen submitted for drug analysis and tested for pH and specific gravity in order to evaluate sample integrity. Suspect tampering if specific gravity is <1.003 and/or pH is not within the range of 4.5 - 8.0  False negatives may result form substances such as bleach added to urine.  False positives may result for the presence of a substance with similar chemical structure to the drug or its metabolite.    This test provides only a PRELIMINARY analytical test result. A more specific alternate chemical method must be used in order to obtain a confirmed analytical result. Gas chromatography/mass spectrometry (GC/MS) is the preferred confirmatory method. Other chemical confirmation methods are available. Clinical consideration and professional judgement should be applied to any drug of abuse test result,  particularly when preliminary positive results are used.    Positive results will be confirmed only at the physicians request. Unconfirmed screening results are to be used only for medical purposes (treatment).        BLOOD SMEAR MICROSCOPIC EXAM ([facility]) - Normal    RBC Morph Normal      Platelets Adequate     CBC W/ AUTO DIFFERENTIAL    Narrative:     The following orders were created for panel order CBC auto differential.  Procedure                               Abnormality         Status                     ---------                               -----------         ------                     CBC with Differential[[id number]]       Abnormal            Final result                 Please view results for these tests on the individual orders.   POCT URINE PREGNANCY    POC Preg Test, Ur Negative       Acceptable Yes       EKG Readings: (Independently Interpreted)   Initial Reading: No STEMI. Rhythm: Normal Sinus Rhythm. Heart Rate: 94. Ectopy: No Ectopy. Axis: Normal. Other Findings: Prolonged QT Interval.   Reviewed by Dr Lockhart (ER staff)     ECG Results              EKG 12-lead (Chest Pain) Age >30 (In process)        Collection Time Result Time QRS Duration [facility] QTC Calculation    01/02/25 08:10:34 01/02/25 08:14:38 80 462                     In process by Interface, Lab In [facility] (01/02/25 08:14:44)                   Narrative:    Test Reason : R07.9,    Vent. Rate :  94 BPM     Atrial Rate :  94 BPM     P-R Int : 142 ms          QRS Dur :  80 ms      QT Int : 370 ms       P-R-T Axes :  57  65 -72 degrees    QTcB Int : 462 ms    Normal sinus rhythm  T wave abnormality, consider inferior ischemia  T wave abnormality, consider anterior ischemia  Prolonged QT  Abnormal ECG  No previous ECGs available    Referred By:            Confirmed By:                                   Imaging Results              X-Ray Chest AP Portable (Final result)  Result time 01/02/25 09:11:01      Final result by Tracey  "Antony ROUSE MD (01/02/25 09:11:01)                   Impression:      No acute findings.      Electronically signed by: Antony Webb  Date:    01/02/2025  Time:    09:11               Narrative:    EXAMINATION:  XR CHEST AP PORTABLE    CLINICAL HISTORY:  Chest pain, unspecified    COMPARISON:  No priors    FINDINGS:  Frontal view of the chest was obtained. The heart is not enlarged.  Lungs are clear.  There is no pneumothorax or significant effusion.                                       Medications   sodium chloride 0.9% bolus 1,000 mL 1,000 mL (1,000 mLs Intravenous New Bag 1/2/25 0905)   ketorolac injection 15 mg (15 mg Intravenous Given 1/2/25 0910)   prochlorperazine injection Soln 5 mg (5 mg Intravenous Given 1/2/25 0923)   diphenhydrAMINE injection 25 mg (25 mg Intravenous Given 1/2/25 0916)     Medical Decision Making  The patient presents with chest pain.  The onset was yesterday.  The course/duration of symptoms is episodic.  Location: right upper chest. Radiating pain: none. The character of symptoms is dull.  The degree at onset was minimal.  The degree at maximum was moderate, only with certain movements and deep breaths.  The degree at present is minimal.  There are exacerbating factors including movement and deep breathing.  The relieving factor is rest.  Risk factors consist of none.  Prior episodes: none.  Associated symptoms: vomited x1 in triage, denies abdominal pain but reports feeling "bubbly" yesterday, denies shortness of breath, cough, fever, and chills.     Workup unremarkable. Chest pain is reproducible. She has medication at home she wishes to take if needed. She will f/u with her pcp.10:18 AM DISPOSITION: The patient is resting comfortably in no acute distress.  She is hemodynamically stable and is without objective evidence for acute process requiring urgent intervention or hospitalization. I provided counseling to patient with regard to condition, the treatment plan, specific conditions " for return, and the importance of follow up. Detailed written and verbal instructions provided to patient and she expressed a verbal understanding of the discharge instructions and overall management plan. Reiterated the importance of medication administration and safety and advised patient to follow up with primary care provider in 3-5 days or sooner if needed.  Answered questions at this time. The patient is stable for discharge.       Amount and/or Complexity of Data Reviewed  Labs: ordered.  Radiology: ordered. Decision-making details documented in ED Course.    Risk  Prescription drug management.      Additional MDM:   Differential Diagnosis:   At this time differential diagnosis is but not limited to chest wall pain, muscle strain, pneumonia               ED Course as of 01/02/25 1019   Thu Jan 02, 2025   0933 X-Ray Chest AP Portable     Impression:     No acute findings.      [RB]   1017 Given strict ED return precautions. I have spoken with the patient and/or caregivers. I have explained the patient's condition, diagnoses and treatment plan based on the information available to me at this time. I have answered the patient's and/or caregiver's questions and addressed any concerns. The patient and/or caregivers have as good an understanding of the patient's diagnosis, condition and treatment plan as can be expected at this point. The vital signs have been stable. The patient's condition is stable and appropriate for discharge from the emergency department.      The patient will pursue further outpatient evaluation with the primary care physician or other designated or consulting physician as outlined in the discharge instructions. The patient and/or caregivers are agreeable to this plan of care and follow-up instructions have been explained in detail. The patient and/or caregivers have received these instructions in written format and have expressed an understanding of the discharge instructions. The patient  and/or caregivers are aware that any significant change in condition or worsening of symptoms should prompt an immediate return to this or the closest emergency department or a call to 911.      [RB]      ED Course User Index  [RB] Ge Bullock ACNP                             Clinical Impression:  Final diagnoses:  [R07.89] Chest wall pain (Primary)  [R11.2] Nausea and vomiting, unspecified vomiting type          ED Disposition Condition    Discharge Stable          ED Prescriptions    None       Follow-up Information       Follow up With Specialties Details Why Contact Info    follow up with your primary care provider in 3-5 days        [facility] Emergency Dept Emergency Medicine  If symptoms worsen [address] [facility] [address] [phone]             Ge Bullock ACNP  01/02/25 1025

## 2025-01-07 ENCOUNTER — HOSPITAL ENCOUNTER (EMERGENCY)
Facility: HOSPITAL | Age: 25
Discharge: HOME | End: 2025-01-07
Attending: EMERGENCY MEDICINE
Payer: MEDICAID

## 2025-01-07 ENCOUNTER — HOSPITAL ENCOUNTER (EMERGENCY)
Facility: HOSPITAL | Age: 25
Discharge: HOME | End: 2025-01-07
Attending: STUDENT IN AN ORGANIZED HEALTH CARE EDUCATION/TRAINING PROGRAM
Payer: MEDICAID

## 2025-01-07 VITALS
HEIGHT: 65 IN | TEMPERATURE: 97 F | DIASTOLIC BLOOD PRESSURE: 67 MMHG | HEART RATE: 86 BPM | BODY MASS INDEX: 26.85 KG/M2 | WEIGHT: 161.19 LBS | SYSTOLIC BLOOD PRESSURE: 113 MMHG | OXYGEN SATURATION: 99 % | RESPIRATION RATE: 20 BRPM

## 2025-01-07 VITALS
BODY MASS INDEX: 25.91 KG/M2 | SYSTOLIC BLOOD PRESSURE: 93 MMHG | HEIGHT: 66 IN | DIASTOLIC BLOOD PRESSURE: 57 MMHG | RESPIRATION RATE: 17 BRPM | WEIGHT: 161.25 LBS | OXYGEN SATURATION: 100 % | HEART RATE: 98 BPM | TEMPERATURE: 98 F

## 2025-01-07 DIAGNOSIS — M54.9 UPPER BACK PAIN ON RIGHT SIDE: ICD-10-CM

## 2025-01-07 DIAGNOSIS — R04.0 EPISTAXIS: Primary | ICD-10-CM

## 2025-01-07 DIAGNOSIS — R04.0 LEFT-SIDED EPISTAXIS: Primary | ICD-10-CM

## 2025-01-07 DIAGNOSIS — R91.1 PULMONARY NODULE SEEN ON IMAGING STUDY: ICD-10-CM

## 2025-01-07 DIAGNOSIS — R91.8 MULTIPLE PULMONARY NODULES DETERMINED BY COMPUTED TOMOGRAPHY OF LUNG: ICD-10-CM

## 2025-01-07 DIAGNOSIS — M62.830 SPASM OF BOTH TRAPEZIUS MUSCLES: ICD-10-CM

## 2025-01-07 LAB
ALBUMIN SERPL-MCNC: 3.8 G/DL (ref 3.5–5)
ALBUMIN/GLOB SERPL: 1 RATIO (ref 1.1–2)
ALP SERPL-CCNC: 88 UNIT/L (ref 40–150)
ALT SERPL-CCNC: 10 UNIT/L (ref 0–55)
ANION GAP SERPL CALC-SCNC: 8 MEQ/L
ANION GAP SERPL CALC-SCNC: 9 MEQ/L
APTT PPP: 35.2 SECONDS (ref 23.2–33.7)
AST SERPL-CCNC: 21 UNIT/L (ref 5–34)
B-HCG UR QL: NEGATIVE
BASOPHILS # BLD AUTO: 0.03 X10(3)/MCL
BASOPHILS # BLD AUTO: 0.03 X10(3)/MCL
BASOPHILS NFR BLD AUTO: 0.3 %
BASOPHILS NFR BLD AUTO: 0.4 %
BILIRUB SERPL-MCNC: 0.6 MG/DL
BUN SERPL-MCNC: 6.4 MG/DL (ref 7–18.7)
BUN SERPL-MCNC: 6.4 MG/DL (ref 7–18.7)
CALCIUM SERPL-MCNC: 9.4 MG/DL (ref 8.4–10.2)
CALCIUM SERPL-MCNC: 9.5 MG/DL (ref 8.4–10.2)
CHLORIDE SERPL-SCNC: 102 MMOL/L (ref 98–107)
CHLORIDE SERPL-SCNC: 102 MMOL/L (ref 98–107)
CO2 SERPL-SCNC: 27 MMOL/L (ref 22–29)
CO2 SERPL-SCNC: 27 MMOL/L (ref 22–29)
CREAT SERPL-MCNC: 0.93 MG/DL (ref 0.55–1.02)
CREAT SERPL-MCNC: 1.08 MG/DL (ref 0.55–1.02)
CREAT/UREA NIT SERPL: 6
CREAT/UREA NIT SERPL: 7
CTP QC/QA: YES
D DIMER PPP IA.FEU-MCNC: 3.95 UG/ML FEU (ref 0–0.5)
EOSINOPHIL # BLD AUTO: 0.07 X10(3)/MCL (ref 0–0.9)
EOSINOPHIL # BLD AUTO: 0.13 X10(3)/MCL (ref 0–0.9)
EOSINOPHIL NFR BLD AUTO: 1 %
EOSINOPHIL NFR BLD AUTO: 1.5 %
ERYTHROCYTE [DISTWIDTH] IN BLOOD BY AUTOMATED COUNT: 11.4 % (ref 11.5–17)
ERYTHROCYTE [DISTWIDTH] IN BLOOD BY AUTOMATED COUNT: 11.6 % (ref 11.5–17)
GFR SERPLBLD CREATININE-BSD FMLA CKD-EPI: >60 ML/MIN/1.73/M2
GFR SERPLBLD CREATININE-BSD FMLA CKD-EPI: >60 ML/MIN/1.73/M2
GLOBULIN SER-MCNC: 4 GM/DL (ref 2.4–3.5)
GLUCOSE SERPL-MCNC: 113 MG/DL (ref 74–100)
GLUCOSE SERPL-MCNC: 97 MG/DL (ref 74–100)
HCT VFR BLD AUTO: 29.7 % (ref 37–47)
HCT VFR BLD AUTO: 30.2 % (ref 37–47)
HGB BLD-MCNC: 9.7 G/DL (ref 12–16)
HGB BLD-MCNC: 9.9 G/DL (ref 12–16)
HOLD SPECIMEN: NORMAL
IMM GRANULOCYTES # BLD AUTO: 0.02 X10(3)/MCL (ref 0–0.04)
IMM GRANULOCYTES # BLD AUTO: 0.02 X10(3)/MCL (ref 0–0.04)
IMM GRANULOCYTES NFR BLD AUTO: 0.2 %
IMM GRANULOCYTES NFR BLD AUTO: 0.3 %
INR PPP: 1
INR PPP: 1.1
LYMPHOCYTES # BLD AUTO: 1.57 X10(3)/MCL (ref 0.6–4.6)
LYMPHOCYTES # BLD AUTO: 2.47 X10(3)/MCL (ref 0.6–4.6)
LYMPHOCYTES NFR BLD AUTO: 22.6 %
LYMPHOCYTES NFR BLD AUTO: 28.5 %
MCH RBC QN AUTO: 28.1 PG (ref 27–31)
MCH RBC QN AUTO: 28.3 PG (ref 27–31)
MCHC RBC AUTO-ENTMCNC: 32.7 G/DL (ref 33–36)
MCHC RBC AUTO-ENTMCNC: 32.8 G/DL (ref 33–36)
MCV RBC AUTO: 86.1 FL (ref 80–94)
MCV RBC AUTO: 86.3 FL (ref 80–94)
MONOCYTES # BLD AUTO: 0.48 X10(3)/MCL (ref 0.1–1.3)
MONOCYTES # BLD AUTO: 0.68 X10(3)/MCL (ref 0.1–1.3)
MONOCYTES NFR BLD AUTO: 6.9 %
MONOCYTES NFR BLD AUTO: 7.8 %
NEUTROPHILS # BLD AUTO: 4.78 X10(3)/MCL (ref 2.1–9.2)
NEUTROPHILS # BLD AUTO: 5.35 X10(3)/MCL (ref 2.1–9.2)
NEUTROPHILS NFR BLD AUTO: 61.7 %
NEUTROPHILS NFR BLD AUTO: 68.8 %
NRBC BLD AUTO-RTO: 0 %
NRBC BLD AUTO-RTO: 0 %
PLATELET # BLD AUTO: 233 X10(3)/MCL (ref 130–400)
PLATELET # BLD AUTO: 277 X10(3)/MCL (ref 130–400)
PMV BLD AUTO: 11.4 FL (ref 7.4–10.4)
PMV BLD AUTO: 11.6 FL (ref 7.4–10.4)
POTASSIUM SERPL-SCNC: 3.5 MMOL/L (ref 3.5–5.1)
POTASSIUM SERPL-SCNC: 3.7 MMOL/L (ref 3.5–5.1)
PROT SERPL-MCNC: 7.8 GM/DL (ref 6.4–8.3)
PROTHROMBIN TIME: 13.7 SECONDS (ref 11.4–14)
PROTHROMBIN TIME: 14.1 SECONDS (ref 11.4–14)
RBC # BLD AUTO: 3.45 X10(6)/MCL (ref 4.2–5.4)
RBC # BLD AUTO: 3.5 X10(6)/MCL (ref 4.2–5.4)
SODIUM SERPL-SCNC: 137 MMOL/L (ref 136–145)
SODIUM SERPL-SCNC: 138 MMOL/L (ref 136–145)
WBC # BLD AUTO: 6.95 X10(3)/MCL (ref 4.5–11.5)
WBC # BLD AUTO: 8.68 X10(3)/MCL (ref 4.5–11.5)

## 2025-01-07 PROCEDURE — 30901 CONTROL OF NOSEBLEED: CPT | Mod: LT

## 2025-01-07 PROCEDURE — 85025 COMPLETE CBC W/AUTO DIFF WBC: CPT | Performed by: STUDENT IN AN ORGANIZED HEALTH CARE EDUCATION/TRAINING PROGRAM

## 2025-01-07 PROCEDURE — 85379 FIBRIN DEGRADATION QUANT: CPT | Performed by: STUDENT IN AN ORGANIZED HEALTH CARE EDUCATION/TRAINING PROGRAM

## 2025-01-07 PROCEDURE — 85610 PROTHROMBIN TIME: CPT | Performed by: STUDENT IN AN ORGANIZED HEALTH CARE EDUCATION/TRAINING PROGRAM

## 2025-01-07 PROCEDURE — 85610 PROTHROMBIN TIME: CPT | Performed by: EMERGENCY MEDICINE

## 2025-01-07 PROCEDURE — 99285 EMERGENCY DEPT VISIT HI MDM: CPT | Mod: 25

## 2025-01-07 PROCEDURE — 81025 URINE PREGNANCY TEST: CPT | Performed by: STUDENT IN AN ORGANIZED HEALTH CARE EDUCATION/TRAINING PROGRAM

## 2025-01-07 PROCEDURE — 85025 COMPLETE CBC W/AUTO DIFF WBC: CPT | Performed by: EMERGENCY MEDICINE

## 2025-01-07 PROCEDURE — 80053 COMPREHEN METABOLIC PANEL: CPT | Performed by: STUDENT IN AN ORGANIZED HEALTH CARE EDUCATION/TRAINING PROGRAM

## 2025-01-07 PROCEDURE — 99283 EMERGENCY DEPT VISIT LOW MDM: CPT | Mod: 25,27

## 2025-01-07 PROCEDURE — 25500020 PHARM REV CODE 255

## 2025-01-07 PROCEDURE — 25000003 PHARM REV CODE 250: Performed by: EMERGENCY MEDICINE

## 2025-01-07 PROCEDURE — 85730 THROMBOPLASTIN TIME PARTIAL: CPT | Performed by: STUDENT IN AN ORGANIZED HEALTH CARE EDUCATION/TRAINING PROGRAM

## 2025-01-07 RX ORDER — BACLOFEN 10 MG/1
10 TABLET ORAL 3 TIMES DAILY PRN
Qty: 21 TABLET | Refills: 0 | Status: SHIPPED | OUTPATIENT
Start: 2025-01-07

## 2025-01-07 RX ORDER — VITAMIN A 3000 MCG
1 CAPSULE ORAL
Qty: 60 ML | Refills: 12 | Status: SHIPPED | OUTPATIENT
Start: 2025-01-07

## 2025-01-07 RX ORDER — OXYMETAZOLINE HCL 0.05 %
1 SPRAY, NON-AEROSOL (ML) NASAL
Status: DISCONTINUED | OUTPATIENT
Start: 2025-01-07 | End: 2025-01-07 | Stop reason: HOSPADM

## 2025-01-07 RX ORDER — SULFAMETHOXAZOLE AND TRIMETHOPRIM 800; 160 MG/1; MG/1
1 TABLET ORAL 2 TIMES DAILY
Qty: 14 TABLET | Refills: 0 | Status: SHIPPED | OUTPATIENT
Start: 2025-01-07 | End: 2025-01-14

## 2025-01-07 RX ORDER — OXYMETAZOLINE HCL 0.05 %
2 SPRAY, NON-AEROSOL (ML) NASAL ONCE
Status: COMPLETED | OUTPATIENT
Start: 2025-01-07 | End: 2025-01-07

## 2025-01-07 RX ADMIN — OXYMETAZOLINE HCL 2 SPRAY: 0.05 SPRAY NASAL at 03:01

## 2025-01-07 RX ADMIN — IOHEXOL 70 ML: 350 INJECTION, SOLUTION INTRAVENOUS at 05:01

## 2025-01-07 NOTE — CONSULTS
Otolaryngology - Consult Note    Patient Name: Mely Hardy  Encounter Date: 01/07/2025  Date of Admission: 1/7/2025  YOB: 2000  Physician: Tommie Medrano MD      Reason for Consultation: Epistaxis      History of Present Illness: Mely Hardy is a 24 y.o. female with PMH including pituitary adenoma status post resection in 2020 (performed in [place]) who presents to the ED today for left-sided epistaxis that she first noted spontaneously yesterday evening.  She reports that the bleeding was initially stopped with pressure, however resumed today.  No known history of bleeding disorders, not on blood thinners.      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 Prolactin levels remain low (about 5) in the absence of dopamine agonist treatment since the surgery.    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       Current Outpatient Medications   Medication Instructions    (Magic mouthwash) 1:1:1 diphenhydrAMINE(Benadryl) 12.5mg/5ml liq, aluminum & magnesium hydroxide-simethicone (Maalox), LIDOcaine viscous 2% 5 mLs, Swish & Spit, Every 4 hours PRN    baclofen (LIORESAL) 10 mg, Oral, 3 times daily PRN    desmopressin (DDAVP) 0.1 MG Tab TAKE 1/2 (ONE-HALF) TABLET BY MOUTH ONCE DAILY IN THE MORNING AND 1 (WHOLE TABLET) IN THE EVENING    fluticasone propionate (FLONASE) 50 mcg, Each Nostril, 2 times daily PRN    fluticasone propionate (FLONASE) 50 mcg, Each Nostril, Daily    folic acid (FOLVITE) 1 mg, Oral, Daily    HYDROcodone-acetaminophen (NORCO) 5-325 mg per tablet 1 tablet, Oral    hydrocortisone (CORTEF) 5 MG Tab Take two tablets in the morning daily and one tablet after lunch.    levocetirizine (XYZAL) 5 mg, Oral, Nightly    levothyroxine (SYNTHROID) 125 mcg, Oral, Before breakfast    ondansetron  "(ZOFRAN-ODT) 4 mg, Oral, Every 8 hours PRN    pantoprazole (PROTONIX) 40 mg, Oral, Daily    sodium chloride (SALINE NASAL) 0.65 % nasal spray 1 spray, Nasal, As needed (PRN)    SPRINTEC, 28, 0.25-35 mg-mcg per tablet 1 tablet, Oral, Daily    triamcinolone acetonide 0.1% (KENALOG) 0.1 % ointment Topical (Top), 2 times daily       Past Surgical History:   Procedure Laterality Date    ADENOIDECTOMY      SURGICAL REMOVAL OF PITUITARY TUMOR BY TRANSSPHENOIDAL APPROACH N/A 1/3/2020    Procedure: RESECTION, NEOPLASM, PITUITARY, TRANSSPHENOIDAL APPROACH ETSR /c navigation in joint procedure /c ENT;  Surgeon: Dino Llamas MD;  Location: [facility] MAIN OR;  Service: ENT;  Laterality: N/A;    TONSILLECTOMY      TONSILLECTOMY AND ADENOIDECTOMY      TYMPANOSTOMY TUBE PLACEMENT         Review of patient's allergies indicates:  No Known Allergies        Objective:  Vitals:    01/07/25 1526   BP: 113/67   BP Location: Right arm   Pulse: 86   Resp: 20   Temp: 97.3 °F (36.3 °C)   TempSrc: Temporal   SpO2: 99%   Weight: 73.1 kg (161 lb 2.5 oz)   Height: 5' 5" (1.651 m)       General Appearance: well nourished, well-developed, alert, oriented, in no acute distress, no dysphonia  Head/Face: Normocephalic, atraumatic  Eyes: EOMI, normal conjunctiva  Ears: Hears well at normal conversation volume  AU: External ears within normal limits   Nose:  External nose within normal limits, septum midline, mild-to-moderate epistaxis from left nostril, old blood in right nostril  Oral Cavity & Oropharynx: Lips normal. Tongue without masses or lesions. Dentition within normal limits. No masses, lesions, or leukoplakia. Floor of mouth and base of tongue are soft.  Posterior oropharyngeal wall with evidence of old blood  Neck: Soft, non-tender, no palpable lymph nodes. Thyroid without nodules or goiter.   Respiratory: Nonlabored breathing on room air. No stridor or stertor.  Neuro: CN II - XII intact  Psychiatric: oriented to time, place and person, no " depression, anxiety or agitation    Procedure:  Epistaxis management  After informed consent was obtained, a Merocel sponge was placed with speculum and forceps to the floor of the left nasal cavity, abutting the anterior septum.  Afrin was then applied to the sponge.  Hemostasis was subsequently obtained.      Labs & Imaging    H/H 9.9/30.2    Assessment: Mely Hardy is a 24 y.o. female who presents with left-sided epistaxis, now hemostatic with nasal packing performed at bedside.      Plan:   -From ENT perspective, okay for discharge  -Will have patient follow-up in ENT clinic on 01/09 for removal of packing  -Recommend Bactrim for 5 days for staph prophylaxis  -Please call ENT with any questions or concerns      Tommie Medrano MD  [employer] Otolaryngology PGY-IV  01/07/2025 4:56 PM

## 2025-01-07 NOTE — ED PROVIDER NOTES
Encounter Date: 1/7/2025       History     Chief Complaint   Patient presents with    Epistaxis     States left sided nose bleed starting just PTA.  Denies Hx same, denies taking blood thinners or OTC meds which may contribute.       HPI    24-year-old female with a past medical history of deficiency of ATC H as well as a prolactinoma status post removal presents emergency department for a nosebleed which started after running to hit her in the house as well as some right-sided back pain worse when she takes a deep breath.  She states that she was seen here recently for the back pain and was not sent home on any medications.  States the pain continues.  States it hurts when she takes a deep breath.    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 Prolactin levels remain low (about 5) in the absence of dopamine agonist treatment since the surgery.    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     Past Surgical History:   Procedure Laterality Date    ADENOIDECTOMY      SURGICAL REMOVAL OF PITUITARY TUMOR BY TRANSSPHENOIDAL APPROACH N/A 1/3/2020    Procedure: RESECTION, NEOPLASM, PITUITARY, TRANSSPHENOIDAL APPROACH ETSR /c navigation in joint procedure /c ENT;  Surgeon: Dino Llamas MD;  Location: [facility] MAIN OR;  Service: ENT;  Laterality: N/A;    TONSILLECTOMY      TONSILLECTOMY AND ADENOIDECTOMY      TYMPANOSTOMY TUBE PLACEMENT       Family History   Problem Relation Name Age of Onset    No Known Problems Mother      No Known Problems Father      No Known Problems Sister      No Known Problems Brother      No Known Problems Maternal Aunt      No Known Problems Maternal Uncle      No Known Problems Paternal Aunt      No Known Problems Paternal Uncle       Cancer Maternal Grandmother      Hypertension Maternal Grandmother      No Known Problems Maternal Grandfather      No Known Problems Paternal Grandmother      No Known Problems Paternal Grandfather      Aneurysm Neg Hx      Clotting disorder Neg Hx      Dementia Neg Hx      Diabetes Neg Hx      Fainting Neg Hx      Heart disease Neg Hx      Hyperlipidemia Neg Hx      Kidney disease Neg Hx      Liver disease Neg Hx      Migraines Neg Hx      Neuropathy Neg Hx      Obesity Neg Hx      Parkinsonism Neg Hx      Seizures Neg Hx      Stroke Neg Hx      Tremor Neg Hx       Social History     Tobacco Use    Smoking status: Never     Passive exposure: Never    Smokeless tobacco: Never   Substance Use Topics    Alcohol use: Never    Drug use: Never     Review of Systems   Constitutional:  Negative for fever.   HENT:  Positive for nosebleeds.    Respiratory:  Negative for cough.         Pain with inspiration   Cardiovascular:  Negative for chest pain.   Gastrointestinal:  Negative for abdominal pain, constipation, diarrhea, nausea and vomiting.   Musculoskeletal:  Positive for back pain.   Neurological:  Negative for headaches.   All other systems reviewed and are negative.      Physical Exam     Initial Vitals [01/07/25 0257]   BP Pulse Resp Temp SpO2   (!) 93/57 98 17 98.2 °F (36.8 °C) 100 %      MAP       --         Physical Exam    Nursing note and vitals reviewed.  Constitutional: She appears well-developed and well-nourished. No distress.   HENT:   Dry blood to the left Galo with no active bleeding   Cardiovascular:  Normal rate and regular rhythm.           Pulmonary/Chest: Breath sounds normal. No respiratory distress. She has no wheezes. She has no rhonchi. She has no rales.   Abdominal: Abdomen is soft. There is no abdominal tenderness. There is no rebound and no guarding.   Musculoskeletal:         General: Tenderness (tenderness to the trapezius muscle bilaterally with palpation reproducing patient's complaint)  present. Normal range of motion.     Neurological: She is alert and oriented to person, place, and time. She has normal strength.   Skin: Skin is warm. Capillary refill takes less than 2 seconds.         ED Course   Procedures  Labs Reviewed   COMPREHENSIVE METABOLIC PANEL - Abnormal       Result Value    Sodium 138      Potassium 3.7      Chloride 102      CO2 27      Glucose 113 (*)     Blood Urea Nitrogen 6.4 (*)     Creatinine 1.08 (*)     Calcium 9.4      Protein Total 7.8      Albumin 3.8      Globulin 4.0 (*)     Albumin/Globulin Ratio 1.0 (*)     Bilirubin Total 0.6      ALP 88      ALT 10      AST 21      eGFR >60      Anion Gap 9.0      BUN/Creatinine Ratio 6     APTT - Abnormal    PTT 35.2 (*)    CBC WITH DIFFERENTIAL - Abnormal    WBC 8.68      RBC 3.45 (*)     Hgb 9.7 (*)     Hct 29.7 (*)     MCV 86.1      MCH 28.1      MCHC 32.7 (*)     RDW 11.4 (*)     Platelet 233      MPV 11.6 (*)     Neut % 61.7      Lymph % 28.5      Mono % 7.8      Eos % 1.5      Basophil % 0.3      Lymph # 2.47      Neut # 5.35      Mono # 0.68      Eos # 0.13      Baso # 0.03      IG# 0.02      IG% 0.2      NRBC% 0.0     D DIMER, QUANTITATIVE - Abnormal    D-Dimer 3.95 (*)    PROTIME-INR - Normal    PT 13.7      INR 1.0     CBC W/ AUTO DIFFERENTIAL    Narrative:     The following orders were created for panel order CBC auto differential.  Procedure                               Abnormality         Status                     ---------                               -----------         ------                     CBC with Differential[[id number]]       Abnormal            Final result                 Please view results for these tests on the individual orders.   EXTRA TUBES    Narrative:     The following orders were created for panel order EXTRA TUBES.  Procedure                               Abnormality         Status                     ---------                               -----------         ------                     Gold  Top Hold[[id number]]                                   Final result                 Please view results for these tests on the individual orders.   GOLD TOP HOLD    Extra Tube Hold for add-ons.     POCT URINE PREGNANCY    POC Preg Test, Ur Negative       Acceptable Yes            Imaging Results              CTA Chest Non-Coronary (PE Studies) (Preliminary result)  Result time 01/07/25 06:12:26      Preliminary result by Heriberto Bentley MD (01/07/25 06:12:26)                   Narrative:    START OF REPORT:  Technique: CT Scan of the chest was performed with intravenous contrast with direct axial images as well as sagittal and coronal reconstruction images pulmonary embolus protocol.    Dosage Information: Automated Exposure Control was utilized 131.91 mGy.cm.    Comparison: None.    Clinical History: States left sided nose bleed starting just PTA. Denies Hx same, denies taking blood thinners or OTC meds which may contribute.    Findings:  Soft Tissues: Unremarkable.  Lines and Tubes: None.  Neck: The visualized soft tissues of the neck appear unremarkable.  Mediastinum: The mediastinal structures are within normal limits.  Heart: The heart appears unremarkable.  Aorta: Unremarkable appearing aorta.  Pulmonary Arteries: No filling defects are seen in the pulmonary arteries to suggest pulmonary embolus.  Lungs: There is mild non specific dependent change at the lung bases. There are a few small scattered nodules noted in the anterior segment of the right upper lobe, medial and lateral segments of the right middle lobe, posterior and lateral basal segments of the right lower lobe, superior lingular segment of the left upper lobe and posterior basal segment of the left lower lobe. The largest nodule measures approximately 5.7 mm in the posterior basal segment of the right lower lobe seen on series 12 image 47. No acute focal infiltrate or consolidation is identified.  Pleura: No effusions or  pneumothorax are identified.  Bony Structures: The visualized bony structures appear unremarkable.  Abdomen: Unremarkable.      Impression:  1. There are a few small scattered nodules noted in the anterior segment of the right upper lobe, medial and lateral segments of the right middle lobe, posterior and lateral basal segments of the right lower lobe, superior lingular segment of the left upper lobe and posterior basal segment of the left lower lobe. The largest nodule measures approximately 5.7 mm in the posterior basal segment of the right lower lobe seen on series 12 image 47. No acute focal infiltrate or consolidation is identified. Correlate clinically as regards additional evaluation and follow-up.  2. No CT evidence of pulmonary embolism or other acute intrathoracic pathology. Details and other findings as discussed above.                                         X-Ray Chest 1 View (Preliminary result)  Result time 01/07/25 04:12:08      Wet Read by Jin Villeda MD (01/07/25 04:12:08, [facility], Emergency Medicine)    Lungs clear no consolidations                                     Medications   iohexoL (OMNIPAQUE 350) 350 mg iodine/mL injection (70 mLs Intravenous Given 1/7/25 0500)     Medical Decision Making  Initial Assessment:       Nosebleed/musculoskeletal pain      Differential Diagnosis:   Judging by the patient's chief complaint and pertinent history, the patient has the following possible differential diagnoses, including but not limited to the following.  Some of these are deemed to be lower likelihood and some more likely based on my physical exam and history combined with possible lab work and/or imaging studies.   Please see the pertinent studies, and refer to the HPI.  Some of these diagnoses will take further evaluation to fully rule out, perhaps as an outpatient and the patient was encouraged to follow up when discharged for more comprehensive  evaluation.      Epistaxis, muscle spasm, PE, pneumothorax,  as well as multiple other possible etiologies      Problems Addressed:  Multiple pulmonary nodules determined by computed tomography of lung: undiagnosed new problem with uncertain prognosis     Details: Referral to pulmonology police  Spasm of both trapezius muscles: acute illness or injury     Details: Muscle relaxer, OTC medication, stretch    Amount and/or Complexity of Data Reviewed  Labs: ordered. Decision-making details documented in ED Course.  Radiology: ordered and independent interpretation performed. Decision-making details documented in ED Course.    Risk  OTC drugs.  Prescription drug management.  Diagnosis or treatment significantly limited by social determinants of health.               ED Course as of 01/07/25 0617   Tue Jan 07, 2025   0325 WBC: 8.68 [BS]   0326 Hemoglobin(!): 9.7 [BS]   0326 Platelet Count: 233 [BS]   0336 Sodium: 138 [BS]   0336 Potassium: 3.7 [BS]   0336 Chloride: 102 [BS]   0336 CO2: 27 [BS]   0336 Glucose(!): 113 [BS]   0336 BUN(!): 6.4 [BS]   0336 Creatinine(!): 1.08 [BS]   0338 hCG Qualitative, Urine: Negative [BS]   0412 D-Dimer(!): 3.95 [BS]   0613 CTA Chest Non-Coronary (PE Studies)  No PE but numerous nodules in the lung.  Will refer patient to a pulmonologist for this.  Unexpected finding.  Patient and mother informed patient of the findings [BS]      ED Course User Index  [BS] Jin Villeda MD                           Clinical Impression:  Final diagnoses:  [M54.9] Upper back pain on right side  [R04.0] Left-sided epistaxis (Primary)  [M62.830] Spasm of both trapezius muscles  [R91.1] Pulmonary nodule seen on imaging study  [R91.8] Multiple pulmonary nodules determined by computed tomography of lung          ED Disposition Condition    Discharge Stable          ED Prescriptions       Medication Sig Dispense Start Date End Date Auth. Provider    sodium chloride (SALINE NASAL) 0.65 % nasal spray 1 spray  by Nasal route as needed for Congestion. 60 mL 1/7/2025 -- Jin Villeda MD    baclofen (LIORESAL) 10 MG tablet Take 1 tablet (10 mg total) by mouth 3 (three) times daily as needed (muscle spasms). 21 tablet 1/7/2025 -- Jin Villeda MD          Follow-up Information       Follow up With Specialties Details Why Contact Info Additional Information    [facility] Pulmonology Schedule an appointment as soon as possible for a visit   [address] [place] [id number] [phone] Entrance 1    [facility] Emergency Medicine Go to  If symptoms worsen [address] [place] [id number] [phone]              Jin Villeda MD  01/07/25 0617

## 2025-01-07 NOTE — ED PROVIDER NOTES
Encounter Date: 1/7/2025       History     Chief Complaint   Patient presents with    Epistaxis     C/o nose bleed from bilateral nares with blood clots this afternoon. States left from ER this am after bleed from one nare . States just got nose spray but did not stop bleed today.      Patient presenting for evaluation of bilateral nosebleed, was seen in the emergency department this morning at around 3:00 a.m., a CT scan and workup was performed, patient went home went take a nap and upon awakening experienced another nosebleed.  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 Prolactin levels remain low (about 5) in the absence of dopamine agonist treatment since the surgery.    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     Past Surgical History:   Procedure Laterality Date    ADENOIDECTOMY      SURGICAL REMOVAL OF PITUITARY TUMOR BY TRANSSPHENOIDAL APPROACH N/A 1/3/2020    Procedure: RESECTION, NEOPLASM, PITUITARY, TRANSSPHENOIDAL APPROACH ETSR /c navigation in joint procedure /c ENT;  Surgeon: Dino Llamas MD;  Location: [facility] MAIN OR;  Service: ENT;  Laterality: N/A;    TONSILLECTOMY      TONSILLECTOMY AND ADENOIDECTOMY      TYMPANOSTOMY TUBE PLACEMENT       Family History   Problem Relation Name Age of Onset    No Known Problems Mother      No Known Problems Father      No Known Problems Sister      No Known Problems Brother      No Known Problems Maternal Aunt      No Known Problems Maternal Uncle      No Known Problems Paternal Aunt      No Known Problems Paternal Uncle      Cancer Maternal Grandmother      Hypertension Maternal Grandmother      No Known Problems Maternal Grandfather      No  Known Problems Paternal Grandmother      No Known Problems Paternal Grandfather      Aneurysm Neg Hx      Clotting disorder Neg Hx      Dementia Neg Hx      Diabetes Neg Hx      Fainting Neg Hx      Heart disease Neg Hx      Hyperlipidemia Neg Hx      Kidney disease Neg Hx      Liver disease Neg Hx      Migraines Neg Hx      Neuropathy Neg Hx      Obesity Neg Hx      Parkinsonism Neg Hx      Seizures Neg Hx      Stroke Neg Hx      Tremor Neg Hx       Social History     Tobacco Use    Smoking status: Never     Passive exposure: Never    Smokeless tobacco: Never   Substance Use Topics    Alcohol use: Never    Drug use: Never     Review of Systems   HENT:  Positive for nosebleeds.    All other systems reviewed and are negative.      Physical Exam     Initial Vitals [01/07/25 1526]   BP Pulse Resp Temp SpO2   113/67 86 20 97.3 °F (36.3 °C) 99 %      MAP       --         Physical Exam    Constitutional: She appears well-developed and well-nourished.   HENT:   Active left nostril bright red blood with clot   Eyes: EOM are normal. Pupils are equal, round, and reactive to light.   Neck:   Normal range of motion.  Cardiovascular:  Normal rate and regular rhythm.           Pulmonary/Chest: Breath sounds normal.   Abdominal: Abdomen is soft.   Musculoskeletal:      Cervical back: Normal range of motion.     Skin: Skin is warm and dry.   Psychiatric: She has a normal mood and affect.         ED Course   Procedures  Labs Reviewed   BASIC METABOLIC PANEL - Abnormal       Result Value    Sodium 137      Potassium 3.5      Chloride 102      CO2 27      Glucose 97      Blood Urea Nitrogen 6.4 (*)     Creatinine 0.93      BUN/Creatinine Ratio 7      Calcium 9.5      Anion Gap 8.0      eGFR >60     PROTIME-INR - Abnormal    PT 14.1 (*)     INR 1.1     CBC WITH DIFFERENTIAL - Abnormal    WBC 6.95      RBC 3.50 (*)     Hgb 9.9 (*)     Hct 30.2 (*)     MCV 86.3      MCH 28.3      MCHC 32.8 (*)     RDW 11.6      Platelet 277      MPV 11.4  (*)     Neut % 68.8      Lymph % 22.6      Mono % 6.9      Eos % 1.0      Basophil % 0.4      Imm Grans % 0.3      Neut # 4.78      Lymph # 1.57      Mono # 0.48      Eos # 0.07      Baso # 0.03      Imm Gran # 0.02      NRBC% 0.0     CBC W/ AUTO DIFFERENTIAL    Narrative:     The following orders were created for panel order CBC auto differential.  Procedure                               Abnormality         Status                     ---------                               -----------         ------                     CBC with Differential[[id number]]       Abnormal            Final result                 Please view results for these tests on the individual orders.   EXTRA TUBES    Narrative:     The following orders were created for panel order EXTRA TUBES.  Procedure                               Abnormality         Status                     ---------                               -----------         ------                     Gold Top Hold[[id number]]                                   Final result               Pink Top Hold[[id number]]                                   Final result                 Please view results for these tests on the individual orders.   GOLD TOP HOLD    Extra Tube Hold for add-ons.     PINK TOP HOLD    Extra Tube Hold for add-ons.            Imaging Results    None          Medications   oxymetazoline 0.05 % nasal spray 1 spray (1 spray Each Nostril Not Given 1/7/25 1630)   benzocaine 20 % mouth spray ( Mouth/Throat Not Given 1/7/25 1630)   oxymetazoline 0.05 % nasal spray 2 spray (2 sprays Each Nostril Given 1/7/25 1549)     Medical Decision Making  Patient evaluated and a review of previous emergency room note was completed, as well as a repeat of her CBC and basic along with discussion and consult to Ear Nose and Throat.  After left nostril continued to bleed following Afrin administration, and attempted left rhino rocket which was unsuccessful secondary to questionable septal  defect.  ENT was able to evaluate patient and place a intranasal foam agent which has resolved the bleeding.  Patient has been medically cleared for discharge and patient will be seen in the ENT clinic on Thursday.  ENT recommends Bactrim.  Patient will be contacted with the appointment    Amount and/or Complexity of Data Reviewed  Labs: ordered.    Risk  OTC drugs.                                      Clinical Impression:  Final diagnoses:  [R04.0] Epistaxis (Primary)          ED Disposition Condition    Discharge Stable          ED Prescriptions       Medication Sig Dispense Start Date End Date Auth. Provider    sulfamethoxazole-trimethoprim 800-160mg (BACTRIM DS) 800-160 mg Tab Take 1 tablet by mouth 2 (two) times daily. for 7 days 14 tablet 1/7/2025 1/14/2025 Henry Baer MD          Follow-up Information       Follow up With Specialties Details Why Contact Info    [facility] Emergency Medicine  As needed [address] [facility] [id number] [phone]             Henry Baer MD  01/07/25 3201

## 2025-01-09 ENCOUNTER — OFFICE VISIT (OUTPATIENT)
Dept: OTOLARYNGOLOGY | Facility: CLINIC | Age: 25
End: 2025-01-09
Payer: MEDICAID

## 2025-01-09 VITALS
TEMPERATURE: 98 F | HEIGHT: 65 IN | DIASTOLIC BLOOD PRESSURE: 58 MMHG | SYSTOLIC BLOOD PRESSURE: 90 MMHG | WEIGHT: 160.63 LBS | HEART RATE: 73 BPM | BODY MASS INDEX: 26.76 KG/M2

## 2025-01-09 DIAGNOSIS — R04.0 EPISTAXIS: ICD-10-CM

## 2025-01-09 PROCEDURE — 99214 OFFICE O/P EST MOD 30 MIN: CPT | Mod: PBBFAC | Performed by: OTOLARYNGOLOGY

## 2025-01-09 NOTE — PROGRESS NOTES
Patient Name: Mely Hardy   YOB: 2000     Chief Complaint:   Chief Complaint   Patient presents with    Referral     For epistaxis        History of Present Illness:  1/7/2025:  Mely Hardy is a 24 y.o. female with PMH including pituitary adenoma status post resection in 2020 (performed in [facility]) who presents to the ED today for left-sided epistaxis that she first noted spontaneously yesterday evening.  She reports that the bleeding was initially stopped with pressure, however resumed today.  No known history of bleeding disorders, not on blood thinners.     1/9/2025: Patient presents today for follow-up of her epistaxis.  At time she was seen in the emergency room a Merocel pack was placed in left anterior nares with control of the bleeding.  She did not require packing on the right.  She returns today for follow-up.  She has not had any additional bleeding since pack was placed.  Has no other new problems today.  No prior history of epistaxis or other abnormal bleeding.  Does not take any anticoagulants.  No family history of bleeding diathesis.    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 Prolactin levels remain low (about 5) in the absence of dopamine agonist treatment since the surgery.    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     Past Surgical History:   Procedure Laterality Date    ADENOIDECTOMY      SURGICAL REMOVAL OF PITUITARY TUMOR BY TRANSSPHENOIDAL APPROACH N/A 1/3/2020    Procedure: RESECTION, NEOPLASM, PITUITARY, TRANSSPHENOIDAL APPROACH ETSR /c navigation in joint procedure /c ENT;  Surgeon: Dino Llamas MD;  Location: [facility] MAIN OR;  Service: ENT;  Laterality: N/A;    TONSILLECTOMY      TONSILLECTOMY AND ADENOIDECTOMY      TYMPANOSTOMY TUBE  PLACEMENT         Social History:  Social History     Socioeconomic History    Marital status: Single   Tobacco Use    Smoking status: Never     Passive exposure: Never    Smokeless tobacco: Never   Substance and Sexual Activity    Alcohol use: Never    Drug use: Never    Sexual activity: Not Currently   Social History Narrative    ** Merged History Encounter **            Review of Systems:  Unremarkable except as mentioned above.    Current Medications:  Current Outpatient Medications   Medication Sig    baclofen (LIORESAL) 10 MG tablet Take 1 tablet (10 mg total) by mouth 3 (three) times daily as needed (muscle spasms).    desmopressin (DDAVP) 0.1 MG Tab TAKE 1/2 (ONE-HALF) TABLET BY MOUTH ONCE DAILY IN THE MORNING AND 1 (WHOLE TABLET) IN THE EVENING    fluticasone propionate (FLONASE) 50 mcg/actuation nasal spray 1 spray (50 mcg total) by Each Nostril route 2 (two) times daily as needed for Rhinitis.    fluticasone propionate (FLONASE) 50 mcg/actuation nasal spray 1 spray (50 mcg total) by Each Nostril route once daily.    folic acid (FOLVITE) 1 MG tablet Take 1 tablet (1 mg total) by mouth once daily.    hydrocortisone (CORTEF) 5 MG Tab Take two tablets in the morning daily and one tablet after lunch.    levothyroxine (SYNTHROID) 125 MCG tablet Take 1 tablet (125 mcg total) by mouth before breakfast.    ondansetron (ZOFRAN-ODT) 4 MG TbDL Take 1 tablet (4 mg total) by mouth every 8 (eight) hours as needed (nausea).    sodium chloride (SALINE NASAL) 0.65 % nasal spray 1 spray by Nasal route as needed for Congestion.    SPRINTEC, 28, 0.25-35 mg-mcg per tablet Take 1 tablet by mouth once daily.    sulfamethoxazole-trimethoprim 800-160mg (BACTRIM DS) 800-160 mg Tab Take 1 tablet by mouth 2 (two) times daily. for 7 days    (Magic mouthwash) 1:1:1 diphenhydrAMINE(Benadryl) 12.5mg/5ml liq, aluminum & magnesium hydroxide-simethicone (Maalox), LIDOcaine viscous 2% Swish and spit 5 mLs every 4 (four) hours as needed (sore  "throat). (Patient not taking: Reported on 1/9/2025)    HYDROcodone-acetaminophen (NORCO) 5-325 mg per tablet Take 1 tablet by mouth. (Patient not taking: Reported on 1/9/2025)    levocetirizine (XYZAL) 5 MG tablet Take 1 tablet (5 mg total) by mouth every evening. (Patient not taking: Reported on 1/9/2025)    pantoprazole (PROTONIX) 40 MG tablet Take 1 tablet (40 mg total) by mouth once daily.    triamcinolone acetonide 0.1% (KENALOG) 0.1 % ointment Apply topically 2 (two) times daily.     No current facility-administered medications for this visit.        Allergies:  Review of patient's allergies indicates:  No Known Allergies     Family History:  Family History   Problem Relation Name Age of Onset    No Known Problems Mother      No Known Problems Father      No Known Problems Sister      No Known Problems Brother      No Known Problems Maternal Aunt      No Known Problems Maternal Uncle      No Known Problems Paternal Aunt      No Known Problems Paternal Uncle      Cancer Maternal Grandmother      Hypertension Maternal Grandmother      No Known Problems Maternal Grandfather      No Known Problems Paternal Grandmother      No Known Problems Paternal Grandfather      Aneurysm Neg Hx      Clotting disorder Neg Hx      Dementia Neg Hx      Diabetes Neg Hx      Fainting Neg Hx      Heart disease Neg Hx      Hyperlipidemia Neg Hx      Kidney disease Neg Hx      Liver disease Neg Hx      Migraines Neg Hx      Neuropathy Neg Hx      Obesity Neg Hx      Parkinsonism Neg Hx      Seizures Neg Hx      Stroke Neg Hx      Tremor Neg Hx         Physical Exam:  Vital signs:   Vitals:    01/09/25 1052   BP: (!) 90/58   BP Location: Left arm   Patient Position: Sitting   Pulse: 73   Temp: 97.5 °F (36.4 °C)   TempSrc: Oral   Weight: 72.8 kg (160 lb 9.6 oz)   Height: 5' 5" (1.651 m)   General:  Well-developed well-nourished female in no acute distress.  Voice is normal.  Head and face:  Normocephalic.  No facial lesions.  No " temporomandibular joint tenderness or click.  Nose:  Nasal dorsum is unremarkable.  Merocel pack is present in the left nasal cavity.  She has no active bleeding at this time.  Merocel pack was removed and the nose was suctioned of bloody mucus.  There was no active bleeding or intranasal masses or lesions.  No bleeding from the right.  Eyes:  Extraocular muscles intact.  No nystagmus.  No exophthalmos or enophthalmos.  Neurologic:  Alert and oriented.  Cranial nerves 2-12 are grossly normal.         Assessment/Plan:  Epistaxis-resolved.      Plan:   Continue with saline nasal spray and nasal drops for the next several days to keep the nose moist.  No strenuous activity for the next 3-4 days.    Follow-up p.r.ted.      Tomer Fowler M.D.

## 2025-01-28 PROBLEM — R04.0 EPISTAXIS: Status: ACTIVE | Noted: 2025-01-28

## 2025-02-17 ENCOUNTER — OFFICE VISIT (OUTPATIENT)
Dept: FAMILY MEDICINE | Facility: CLINIC | Age: 25
End: 2025-02-17
Payer: MEDICAID

## 2025-02-17 VITALS
RESPIRATION RATE: 20 BRPM | HEART RATE: 68 BPM | WEIGHT: 160 LBS | TEMPERATURE: 98 F | SYSTOLIC BLOOD PRESSURE: 97 MMHG | HEIGHT: 65 IN | BODY MASS INDEX: 26.66 KG/M2 | DIASTOLIC BLOOD PRESSURE: 61 MMHG | OXYGEN SATURATION: 100 %

## 2025-02-17 DIAGNOSIS — D64.9 NORMOCYTIC ANEMIA: ICD-10-CM

## 2025-02-17 DIAGNOSIS — L20.89 FLEXURAL ATOPIC DERMATITIS: ICD-10-CM

## 2025-02-17 DIAGNOSIS — H66.92 LEFT OTITIS MEDIA, UNSPECIFIED OTITIS MEDIA TYPE: Primary | ICD-10-CM

## 2025-02-17 LAB
ALBUMIN SERPL-MCNC: 3.9 G/DL (ref 3.5–5)
ALBUMIN/GLOB SERPL: 1.2 RATIO (ref 1.1–2)
ALP SERPL-CCNC: 64 UNIT/L (ref 40–150)
ALT SERPL-CCNC: 11 UNIT/L (ref 0–55)
ANION GAP SERPL CALC-SCNC: 7 MEQ/L
AST SERPL-CCNC: 25 UNIT/L (ref 5–34)
BASOPHILS # BLD AUTO: 0.03 X10(3)/MCL
BASOPHILS NFR BLD AUTO: 0.5 %
BILIRUB SERPL-MCNC: 0.4 MG/DL
BUN SERPL-MCNC: 8.9 MG/DL (ref 7–18.7)
CALCIUM SERPL-MCNC: 9.5 MG/DL (ref 8.4–10.2)
CHLORIDE SERPL-SCNC: 107 MMOL/L (ref 98–107)
CO2 SERPL-SCNC: 25 MMOL/L (ref 22–29)
CREAT SERPL-MCNC: 0.94 MG/DL (ref 0.55–1.02)
CREAT/UREA NIT SERPL: 9
EOSINOPHIL # BLD AUTO: 0.19 X10(3)/MCL (ref 0–0.9)
EOSINOPHIL NFR BLD AUTO: 3.4 %
ERYTHROCYTE [DISTWIDTH] IN BLOOD BY AUTOMATED COUNT: 12 % (ref 11.5–17)
GFR SERPLBLD CREATININE-BSD FMLA CKD-EPI: >60 ML/MIN/1.73/M2
GLOBULIN SER-MCNC: 3.2 GM/DL (ref 2.4–3.5)
GLUCOSE SERPL-MCNC: 83 MG/DL (ref 74–100)
HCT VFR BLD AUTO: 31.6 % (ref 37–47)
HGB BLD-MCNC: 10.3 G/DL (ref 12–16)
IMM GRANULOCYTES # BLD AUTO: 0.01 X10(3)/MCL (ref 0–0.04)
IMM GRANULOCYTES NFR BLD AUTO: 0.2 %
LYMPHOCYTES # BLD AUTO: 3.07 X10(3)/MCL (ref 0.6–4.6)
LYMPHOCYTES NFR BLD AUTO: 54.3 %
MCH RBC QN AUTO: 28.3 PG (ref 27–31)
MCHC RBC AUTO-ENTMCNC: 32.6 G/DL (ref 33–36)
MCV RBC AUTO: 86.8 FL (ref 80–94)
MONOCYTES # BLD AUTO: 0.33 X10(3)/MCL (ref 0.1–1.3)
MONOCYTES NFR BLD AUTO: 5.8 %
NEUTROPHILS # BLD AUTO: 2.02 X10(3)/MCL (ref 2.1–9.2)
NEUTROPHILS NFR BLD AUTO: 35.8 %
NRBC BLD AUTO-RTO: 0 %
PLATELET # BLD AUTO: 284 X10(3)/MCL (ref 130–400)
PMV BLD AUTO: 11.8 FL (ref 7.4–10.4)
POTASSIUM SERPL-SCNC: 4.2 MMOL/L (ref 3.5–5.1)
PROT SERPL-MCNC: 7.1 GM/DL (ref 6.4–8.3)
RBC # BLD AUTO: 3.64 X10(6)/MCL (ref 4.2–5.4)
SODIUM SERPL-SCNC: 139 MMOL/L (ref 136–145)
WBC # BLD AUTO: 5.65 X10(3)/MCL (ref 4.5–11.5)

## 2025-02-17 PROCEDURE — 99214 OFFICE O/P EST MOD 30 MIN: CPT | Mod: PBBFAC

## 2025-02-17 PROCEDURE — 85025 COMPLETE CBC W/AUTO DIFF WBC: CPT

## 2025-02-17 PROCEDURE — 36415 COLL VENOUS BLD VENIPUNCTURE: CPT

## 2025-02-17 PROCEDURE — 80053 COMPREHEN METABOLIC PANEL: CPT

## 2025-02-17 RX ORDER — TRIAMCINOLONE ACETONIDE 1 MG/G
OINTMENT TOPICAL 2 TIMES DAILY
Qty: 80 G | Refills: 2 | Status: SHIPPED | OUTPATIENT
Start: 2025-02-17 | End: 2025-05-18

## 2025-02-17 RX ORDER — AMOXICILLIN AND CLAVULANATE POTASSIUM 875; 125 MG/1; MG/1
1 TABLET, FILM COATED ORAL EVERY 12 HOURS
Qty: 14 TABLET | Refills: 0 | Status: SHIPPED | OUTPATIENT
Start: 2025-02-17 | End: 2025-02-24

## 2025-02-17 NOTE — PROGRESS NOTES
[facility] OFFICE VISIT NOTE  MRN: [id number]  Date: 02/17/2025    Chief Complaint: Medication Refill (All medications/Triamcinolone cream per patient request) and Follow-up      Subjective:    HPI  Mely Hardy is a 25 y.o. female, with PMHx of hypopituitarism s/p adenoma resection, acquired central hypothyroidism, diabetes insipidus, and primary amenorrhea, presenting to [facility] for :    Interval History:  Patient was seen [facility] ER on 01/07/2025 for bilateral epistaxis elevation time and for nasal phone packing was placed which stopped and nosebleeds.  Patient denies having any further nosebleeds since that time in January.  Patient followed up with ENT on 01/09/2025 and was discharged from ENT clinic and told to follow up as necessary. Last H/H from [facility] on 1/7/25 was 9.9/30.2; patient denies ever needing a previous blood transfusion and denies dizziness or syncope or chest pain or shortness of breath.    Eczema, follow up:  - patient requesting refill on triamcinolone cream which she uses for eczema  - currently having worsened flare-up secondary to cold weather located on arms and hands  - has been using hydrocortisone over-the-counter at home        ROS per HPI above.      Healthcare Maintenance:  - No prior Hx of pap smear- has maternal grandmother with hx of cervical cancer   - no known family hx of colon or breast cancer  - denies smoking hx    Care Team:  Dr. Hernando Wang MD- [facility]   Dr. Milind Carcamo MD- [facility]             Outpatient Medications as of 2/17/2025   Medication Sig Dispense Refill    desmopressin (DDAVP) 0.1 MG Tab TAKE 1/2 (ONE-HALF) TABLET BY MOUTH ONCE DAILY IN THE MORNING AND 1 (WHOLE TABLET) IN THE EVENING 45 tablet 5    fluticasone propionate (FLONASE) 50 mcg/actuation nasal spray 1 spray (50 mcg total) by Each Nostril route once daily. 16 g 2    folic acid (FOLVITE) 1 MG tablet Take 1 tablet (1 mg total) by mouth once daily. 360 tablet 0     "hydrocortisone (CORTEF) 5 MG Tab Take two tablets in the morning daily and one tablet after lunch. 90 tablet 11    levothyroxine (SYNTHROID) 125 MCG tablet Take 1 tablet (125 mcg total) by mouth before breakfast. 30 tablet 11    ondansetron (ZOFRAN-ODT) 4 MG TbDL Take 1 tablet (4 mg total) by mouth every 8 (eight) hours as needed (nausea). 15 tablet 0    SPRINTEC, 28, 0.25-35 mg-mcg per tablet Take 1 tablet by mouth once daily. 30 tablet 11    triamcinolone acetonide 0.1% (KENALOG) 0.1 % ointment Apply topically 2 (two) times daily. 80 g 2     No current facility-administered medications on file as of 2/17/2025.          Objective:  Vitals:    02/17/25 1057   BP: 97/61   BP Location: Right arm   Patient Position: Sitting   Pulse: 68   Resp: 20   Temp: 97.9 °F (36.6 °C)   TempSrc: Oral   SpO2: 100%   Weight: 72.6 kg (160 lb)   Height: 5' 5" (1.651 m)       Physical Exam  Constitutional:       General: She is not in acute distress.     Appearance: She is not ill-appearing.      Comments: Pleasant young female   HENT:      Right Ear: External ear normal. There is no impacted cerumen.      Left Ear: Ear canal and external ear normal. There is no impacted cerumen.      Ears:      Comments: Full, bulging left tympanic membrane noted with erythema surrounding membrane and ear canal.  Normal appearing right tympanic membrane without air-fluid level, pus, or bulging noted.  Erythema surrounding right ear canal.      Nose: Nose normal. No congestion or rhinorrhea.      Comments: No bleeding noted bilateral nares     Mouth/Throat:      Mouth: Mucous membranes are moist.      Pharynx: No oropharyngeal exudate or posterior oropharyngeal erythema.   Cardiovascular:      Rate and Rhythm: Normal rate and regular rhythm.      Heart sounds: No murmur heard.  Pulmonary:      Effort: Pulmonary effort is normal. No respiratory distress.      Breath sounds: No wheezing.   Abdominal:      Palpations: Abdomen is soft.      Tenderness: " There is no abdominal tenderness.   Skin:     Comments: Dry, hyperpigmented areas of skin located on patient's dorsal hands B/L, right elbow at extensor surface, and bilateral extensor surfaces of knees with excoriations surrounding areas   Neurological:      Mental Status: She is alert and oriented to person, place, and time.   Psychiatric:         Mood and Affect: Mood normal.         Behavior: Behavior normal.         Assessment/ Plan:    Left otitis media, unspecified otitis media type  - patient to initiate Augmentin 875 mg b.i.d. for 7 days total; we will follow up next clinic visit in 1 month to ensure resolution    Normocytic anemia  - likely secondary to acute blood loss at time of epistaxis  - repeat  CBC with Auto Differential to be collected today along with CMP  - patient is largely asymptomatic at this time and hemodynamically stable    Flexural atopic dermatitis  - condition is stable  - discontinue using over-the-counter hydrocortisone cream  - start to use triamcinolone acetonide 0.1% (KENALOG) 0.1 % ointment; Apply topically 2 (two) times daily.  Dispense: 80 g; Refill: 2  - patient and mother advised to keep area moist with lotion when not utilizing triamcinolone cream throughout the day      *Consider performing Pap smear at next visit if patient amenable.  Follow up for 1-2 months for GYN/ HCM.         Yuli Maldonado DO  [employer]  Resident, HO-3

## 2025-02-18 NOTE — PROGRESS NOTES
"Discussed with resident at time of encounter 02-17-25.  Recent ED evaluation / treatment: epistaxis.  No subsequent episodes.  Eczema flare.    Resident's note reviewed 02-18-25.  ? Dictation error: "phone packing".    Agree with assessment; plan of care appropriate.  Anemia workup if persistent.  Topical emollients; caution with prolonged use of topical corticosteroid medication.  Professional services provided in an outpatient primary care center affiliated with a teaching institution.  "

## 2025-03-01 ENCOUNTER — HOSPITAL ENCOUNTER (EMERGENCY)
Facility: HOSPITAL | Age: 25
Discharge: HOME | End: 2025-03-01
Attending: INTERNAL MEDICINE
Payer: COMMERCIAL

## 2025-03-01 VITALS
SYSTOLIC BLOOD PRESSURE: 90 MMHG | HEART RATE: 66 BPM | BODY MASS INDEX: 28.12 KG/M2 | RESPIRATION RATE: 17 BRPM | DIASTOLIC BLOOD PRESSURE: 58 MMHG | OXYGEN SATURATION: 100 % | WEIGHT: 175 LBS | HEIGHT: 66 IN | TEMPERATURE: 98 F

## 2025-03-01 DIAGNOSIS — J30.1 SEASONAL ALLERGIC RHINITIS DUE TO POLLEN: ICD-10-CM

## 2025-03-01 DIAGNOSIS — L30.9 ECZEMA, UNSPECIFIED TYPE: ICD-10-CM

## 2025-03-01 DIAGNOSIS — H10.10 ALLERGIC CONJUNCTIVITIS, UNSPECIFIED LATERALITY: ICD-10-CM

## 2025-03-01 DIAGNOSIS — J06.9 VIRAL URI WITH COUGH: Primary | ICD-10-CM

## 2025-03-01 PROCEDURE — 99284 EMERGENCY DEPT VISIT MOD MDM: CPT

## 2025-03-01 RX ORDER — TRIAMCINOLONE ACETONIDE 1 MG/G
CREAM TOPICAL 2 TIMES DAILY
Qty: 80 G | Refills: 0 | Status: SHIPPED | OUTPATIENT
Start: 2025-03-01

## 2025-03-01 RX ORDER — FLUTICASONE PROPIONATE 50 MCG
1 SPRAY, SUSPENSION (ML) NASAL DAILY
Qty: 16 G | Refills: 2 | Status: SHIPPED | OUTPATIENT
Start: 2025-03-01

## 2025-03-01 RX ORDER — BENZONATATE 200 MG/1
200 CAPSULE ORAL 3 TIMES DAILY PRN
Qty: 30 CAPSULE | Refills: 0 | Status: SHIPPED | OUTPATIENT
Start: 2025-03-01 | End: 2025-03-11

## 2025-03-01 NOTE — ED PROVIDER NOTES
Encounter Date: 2/28/2025       History     Chief Complaint   Patient presents with    Rash    sinus congestion     States allergie sinus symptoms and rash with cough x 5 days.       Presents with a rash, scattered, associated to congestion, dry cough, eye irritation for the last 3 days. No fever or sick contacts    The history is provided by the patient and a parent.     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 Prolactin levels remain low (about 5) in the absence of dopamine agonist treatment since the surgery.    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     Past Surgical History:   Procedure Laterality Date    ADENOIDECTOMY      SURGICAL REMOVAL OF PITUITARY TUMOR BY TRANSSPHENOIDAL APPROACH N/A 1/3/2020    Procedure: RESECTION, NEOPLASM, PITUITARY, TRANSSPHENOIDAL APPROACH ETSR /c navigation in joint procedure /c ENT;  Surgeon: Dino Llamas MD;  Location: [facility] MAIN OR;  Service: ENT;  Laterality: N/A;    TONSILLECTOMY      TONSILLECTOMY AND ADENOIDECTOMY      TYMPANOSTOMY TUBE PLACEMENT       Family History   Problem Relation Name Age of Onset    No Known Problems Mother      No Known Problems Father      No Known Problems Sister      No Known Problems Brother      No Known Problems Maternal Aunt      No Known Problems Maternal Uncle      No Known Problems Paternal Aunt      No Known Problems Paternal Uncle      Cancer Maternal Grandmother      Hypertension Maternal Grandmother      No Known Problems Maternal Grandfather      No Known Problems Paternal Grandmother      No Known Problems Paternal Grandfather      Aneurysm Neg Hx      Clotting disorder Neg Hx      Dementia Neg Hx      Diabetes Neg Hx      Fainting Neg Hx      Heart disease  Neg Hx      Hyperlipidemia Neg Hx      Kidney disease Neg Hx      Liver disease Neg Hx      Migraines Neg Hx      Neuropathy Neg Hx      Obesity Neg Hx      Parkinsonism Neg Hx      Seizures Neg Hx      Stroke Neg Hx      Tremor Neg Hx       Social History[1]  Review of Systems   HENT:  Positive for congestion.    Respiratory:  Positive for cough.    Skin:  Positive for rash.       Physical Exam     Initial Vitals   BP Pulse Resp Temp SpO2   -- -- -- -- --      MAP       --         Physical Exam    Nursing note and vitals reviewed.  Constitutional: She appears well-developed and well-nourished. No distress.   HENT:   Head: Normocephalic and atraumatic.   Right Ear: External ear normal.   Left Ear: External ear normal.   Nose: Nose normal. Mouth/Throat: Oropharynx is clear and moist. No oropharyngeal exudate.   Eyes: Conjunctivae are normal. Pupils are equal, round, and reactive to light.   Neck: Neck supple. No JVD present.   Normal range of motion.  Cardiovascular:  Normal rate, regular rhythm, normal heart sounds and intact distal pulses.           Pulmonary/Chest: Breath sounds normal.   Abdominal: Abdomen is soft. Bowel sounds are normal. She exhibits no distension. There is no abdominal tenderness. There is no rebound and no guarding.   Musculoskeletal:         General: No edema. Normal range of motion.      Cervical back: Normal range of motion and neck supple.     Lymphadenopathy:     She has no cervical adenopathy.   Neurological: She is alert and oriented to person, place, and time. She has normal strength. GCS score is 15. GCS eye subscore is 4. GCS verbal subscore is 5. GCS motor subscore is 6.   Skin: Skin is warm and dry. Rash (papular rash on right face and forearm) noted.   Psychiatric: Her behavior is normal. Thought content normal.         ED Course   Procedures  Labs Reviewed - No data to display       Imaging Results    None          Medications - No data to display  Medical Decision Making                                     Clinical Impression:  Final diagnoses:  [J06.9] Viral URI with cough (Primary)  [L30.9] Eczema, unspecified type          ED Disposition Condition    Discharge Stable          ED Prescriptions       Medication Sig Dispense Start Date End Date Auth. Provider    benzonatate (TESSALON) 200 MG capsule Take 1 capsule (200 mg total) by mouth 3 (three) times daily as needed. 30 capsule 3/1/2025 3/11/2025 Leonardo James MD    triamcinolone acetonide 0.1% (KENALOG) 0.1 % cream Apply topically 2 (two) times daily. 80 g 3/1/2025 -- Leonardo James MD    fluticasone propionate (FLONASE) 50 mcg/actuation nasal spray 1 spray (50 mcg total) by Each Nostril route once daily. 16 g 3/1/2025 -- Leonardo James MD          Follow-up Information       Follow up With Specialties Details Why Contact Info    Yuli Maldonado DO Family Medicine Schedule an appointment as soon as possible for a visit in 2 weeks  [address] [address]  [phone]      [facility] Emergency Medicine  If symptoms worsen [facility] [facility] [address] [phone]               [1]   Social History  Tobacco Use    Smoking status: Never     Passive exposure: Never    Smokeless tobacco: Never   Substance Use Topics    Alcohol use: Never    Drug use: Never        Leonardo James MD  03/01/25 0013

## 2025-06-23 ENCOUNTER — HOSPITAL ENCOUNTER (EMERGENCY)
Facility: HOSPITAL | Age: 25
Discharge: HOME | End: 2025-06-23
Attending: INTERNAL MEDICINE
Payer: COMMERCIAL

## 2025-06-23 VITALS
WEIGHT: 158.38 LBS | SYSTOLIC BLOOD PRESSURE: 98 MMHG | RESPIRATION RATE: 17 BRPM | HEIGHT: 67 IN | DIASTOLIC BLOOD PRESSURE: 46 MMHG | HEART RATE: 76 BPM | BODY MASS INDEX: 24.86 KG/M2 | OXYGEN SATURATION: 99 % | TEMPERATURE: 98 F

## 2025-06-23 DIAGNOSIS — T14.90XA TRAUMA: ICD-10-CM

## 2025-06-23 DIAGNOSIS — S90.121A CONTUSION OF LESSER TOE OF RIGHT FOOT WITHOUT DAMAGE TO NAIL, INITIAL ENCOUNTER: Primary | ICD-10-CM

## 2025-06-23 PROCEDURE — 25000003 PHARM REV CODE 250: Performed by: INTERNAL MEDICINE

## 2025-06-23 PROCEDURE — 99283 EMERGENCY DEPT VISIT LOW MDM: CPT | Mod: 25

## 2025-06-23 RX ORDER — IBUPROFEN 600 MG/1
600 TABLET, FILM COATED ORAL
Status: COMPLETED | OUTPATIENT
Start: 2025-06-23 | End: 2025-06-23

## 2025-06-23 RX ORDER — NAPROXEN 500 MG/1
500 TABLET ORAL 2 TIMES DAILY PRN
Qty: 15 TABLET | Refills: 0 | Status: SHIPPED | OUTPATIENT
Start: 2025-06-23

## 2025-06-23 RX ADMIN — IBUPROFEN 600 MG: 600 TABLET, FILM COATED ORAL at 03:06

## 2025-06-23 NOTE — ED PROVIDER NOTES
Encounter Date: 6/23/2025       History     Chief Complaint   Patient presents with    Toe Injury     States dropped perfume bottle on right second to last toe just PTA.       Presents with right foot, second and third toe pain after injury with a perfume bottle. States large perfume bottle fell on her foot PTA. States worse with walking, some swelling.     The history is provided by the patient.     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 Prolactin levels remain low (about 5) in the absence of dopamine agonist treatment since the surgery.    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    Thyroid disease      Past Surgical History:   Procedure Laterality Date    ADENOIDECTOMY      SURGICAL REMOVAL OF PITUITARY TUMOR BY TRANSSPHENOIDAL APPROACH N/A 1/3/2020    Procedure: RESECTION, NEOPLASM, PITUITARY, TRANSSPHENOIDAL APPROACH ETSR /c navigation in joint procedure /c ENT;  Surgeon: Dino Llamas MD;  Location: [facility];  Service: ENT;  Laterality: N/A;    TONSILLECTOMY      TONSILLECTOMY AND ADENOIDECTOMY      TYMPANOSTOMY TUBE PLACEMENT       Family History   Problem Relation Name Age of Onset    No Known Problems Mother      No Known Problems Father      No Known Problems Sister      No Known Problems Brother      No Known Problems Maternal Aunt      No Known Problems Maternal Uncle      No Known Problems Paternal Aunt      No Known Problems Paternal Uncle      Cancer Maternal Grandmother      Hypertension Maternal Grandmother      No Known Problems Maternal Grandfather      No Known Problems Paternal Grandmother      No Known Problems Paternal Grandfather      Aneurysm Neg Hx      Clotting disorder Neg Hx      Dementia Neg Hx       Diabetes Neg Hx      Fainting Neg Hx      Heart disease Neg Hx      Hyperlipidemia Neg Hx      Kidney disease Neg Hx      Liver disease Neg Hx      Migraines Neg Hx      Neuropathy Neg Hx      Obesity Neg Hx      Parkinsonism Neg Hx      Seizures Neg Hx      Stroke Neg Hx      Tremor Neg Hx       Social History[1]  Review of Systems   Musculoskeletal:  Positive for arthralgias.       Physical Exam     Initial Vitals [06/23/25 0234]   BP Pulse Resp Temp SpO2   (!) 98/46 76 17 97.5 °F (36.4 °C) 99 %      MAP       --         Physical Exam    Nursing note and vitals reviewed.  Constitutional: She appears well-developed.   HENT:   Head: Normocephalic and atraumatic. Mouth/Throat: Oropharynx is clear and moist.   Eyes: Pupils are equal, round, and reactive to light.   Neck:   Normal range of motion.  Cardiovascular:  Normal rate, regular rhythm, normal heart sounds and intact distal pulses.           Pulmonary/Chest: Breath sounds normal.   Musculoskeletal:         General: Tenderness present. No edema. Normal range of motion.      Cervical back: Normal range of motion.      Comments: Tenderness with mild swelling among dorsal aspect of proximal 2nd and 3rd toes Rt side     Neurological: She is alert and oriented to person, place, and time. She has normal strength.   Skin: Skin is warm and dry. No rash noted.   Psychiatric: Thought content normal.         ED Course   Procedures  Labs Reviewed - No data to display       Imaging Results              X-Ray Foot Complete Right (In process)  Result time 06/23/25 03:14:46                  X-Rays:   Independently Interpreted Readings:   Other Readings:  Rt Foot: No fractures    Medications   ibuprofen tablet 600 mg (has no administration in time range)     Medical Decision Making  Amount and/or Complexity of Data Reviewed  Radiology: ordered and independent interpretation performed. Decision-making details documented in ED Course.    Risk  Prescription drug  management.      Additional MDM:   Differential Diagnosis:   Differential diagnosis includes fracture, sprain, strain, contusion among others                                       Clinical Impression:  Final diagnoses:  [T14.90XA] Trauma  [S90.121A] Contusion of lesser toe of right foot without damage to nail, initial encounter (Primary)          ED Disposition Condition    Discharge Stable          ED Prescriptions       Medication Sig Dispense Start Date End Date Auth. Provider    naproxen (NAPROSYN) 500 MG tablet Take 1 tablet (500 mg total) by mouth 2 (two) times daily as needed (Pain). 15 tablet 6/23/2025 -- Leonardo James MD          Follow-up Information       Follow up With Specialties Details Why Contact Info    Yuli Maldonado DO Family Medicine Schedule an appointment as soon as possible for a visit in 1 month  [address] [facility] [address]  [phone]      [facility] Emergency Medicine  If symptoms worsen [address] [facility] [address] [phone]                   [1]   Social History  Tobacco Use    Smoking status: Never     Passive exposure: Never    Smokeless tobacco: Never   Vaping Use    Vaping status: Never Used   Substance Use Topics    Alcohol use: Never    Drug use: Never        Leonardo James MD  06/23/25 0320

## 2025-07-18 ENCOUNTER — OFFICE VISIT (OUTPATIENT)
Dept: FAMILY MEDICINE | Facility: CLINIC | Age: 25
End: 2025-07-18
Payer: COMMERCIAL

## 2025-07-18 VITALS
DIASTOLIC BLOOD PRESSURE: 63 MMHG | TEMPERATURE: 98 F | BODY MASS INDEX: 25.17 KG/M2 | RESPIRATION RATE: 20 BRPM | HEART RATE: 73 BPM | HEIGHT: 67 IN | SYSTOLIC BLOOD PRESSURE: 96 MMHG | WEIGHT: 160.38 LBS | OXYGEN SATURATION: 100 %

## 2025-07-18 DIAGNOSIS — N91.0 PRIMARY AMENORRHEA: ICD-10-CM

## 2025-07-18 DIAGNOSIS — Z12.4 CERVICAL CANCER SCREENING: Primary | ICD-10-CM

## 2025-07-18 LAB
B-HCG UR QL: NEGATIVE
CTP QC/QA: YES

## 2025-07-18 PROCEDURE — 88174 CYTOPATH C/V AUTO IN FLUID: CPT

## 2025-07-18 PROCEDURE — 99214 OFFICE O/P EST MOD 30 MIN: CPT | Mod: PBBFAC

## 2025-07-18 NOTE — PROGRESS NOTES
[facility] OFFICE VISIT NOTE  MRN: [id number]  Date: 07/18/2025    Chief Complaint: Follow-up (Patient states no concerns just pap.)      Subjective:    HPI  Mely Hardy is a 25 y.o. female  presenting to [facility] for initial pap smear; denies ever having pap done in the past. Patient has great anxiety over any pelvic examination and mother present with patient in room today. Discussion of family history of sister and maternal grandmother with history of cervical cancer. Patient agreeable to pap smear today and not had period since high school around 19 years of age per patient. She states that she has seen gynecology in 2018 for this issue previously and had a pelvic US in 2018 which showed no abnormalities although right ovary was not visualized.   She denies vaginal bleeding, abdominal pain, pelvic pain, vaginal pain, vaginal itching, vaginal discharge, or sexual activity. Patient was once on bromocriptine and OCP for amenorrhea; mother and patient are unclear as to why medication was discontinued. Has history of pituitary adenoma that was surgically removed which may be related to amenorrhea possibly which has been discussed with mother in the past.    ROS per HPI above.    Outpatient Medications as of 7/18/2025   Medication Sig Dispense Refill    desmopressin (DDAVP) 0.1 MG Tab TAKE 1/2 (ONE-HALF) TABLET BY MOUTH ONCE DAILY IN THE MORNING AND 1 (WHOLE TABLET) IN THE EVENING 45 tablet 5    fluticasone propionate (FLONASE) 50 mcg/actuation nasal spray 1 spray (50 mcg total) by Each Nostril route once daily. 16 g 2    hydrocortisone (CORTEF) 5 MG Tab Take two tablets in the morning daily and one tablet after lunch. 90 tablet 11    levothyroxine (SYNTHROID) 125 MCG tablet Take 1 tablet (125 mcg total) by mouth before breakfast. 30 tablet 11    naproxen (NAPROSYN) 500 MG tablet Take 1 tablet (500 mg total) by mouth 2 (two) times daily as needed (Pain). 15 tablet 0    ondansetron (ZOFRAN-ODT) 4 MG TbDL Take 1 tablet (4  "mg total) by mouth every 8 (eight) hours as needed (nausea). 15 tablet 0    SPRINTEC, 28, 0.25-35 mg-mcg per tablet Take 1 tablet by mouth once daily. 30 tablet 11    triamcinolone acetonide 0.1% (KENALOG) 0.1 % cream Apply topically 2 (two) times daily. 80 g 0     No current facility-administered medications on file as of 7/18/2025.           Objective:  Vitals:    07/18/25 1442   BP: 96/63   BP Location: Right arm   Patient Position: Sitting   Pulse: 73   Resp: 20   Temp: 98.4 °F (36.9 °C)   TempSrc: Oral   SpO2: 100%   Weight: 72.8 kg (160 lb 6.4 oz)   Height: 5' 7" (1.702 m)       Physical Exam  Constitutional:       General: She is not in acute distress.     Appearance: She is not ill-appearing.   Cardiovascular:      Rate and Rhythm: Normal rate and regular rhythm.      Heart sounds: No murmur heard.  Pulmonary:      Effort: Pulmonary effort is normal. No respiratory distress.      Breath sounds: No wheezing.   Abdominal:      General: Bowel sounds are normal. There is no distension.      Palpations: Abdomen is soft.      Tenderness: There is no abdominal tenderness. There is no guarding.   Genitourinary:     Comments: Chaperone present throughout duration of pelvic exam.   Unable to fully visualize cervix due to patient bearing down; no blood or discharge in vagina. Normal external female anatomy upon inspection.  Neurological:      Mental Status: She is alert.       Assessment/ Plan:    Cervical cancer screening  Amenorrhea  - POCT Urine Pregnancy negative this visit   - routine screening pap smear performed in clinic today; results pending   - declined STD screening today  - GYN and previous [facility] clinic notes reviewed regarding amenorrhea; patient discontinued taking OCP due to compliance and not desiring to take more medications. Declines OCP therapy at this time; will follow up at next clinic visit for amenorrhea      Follow up in about 3 months for amenorrhea (around 10/18/2025).       Yuli Maldonado" DO  [employer]  Resident, -3

## 2025-07-22 LAB — PSYCHE PATHOLOGY RESULT: NORMAL

## 2025-07-23 ENCOUNTER — TELEPHONE (OUTPATIENT)
Dept: FAMILY MEDICINE | Facility: CLINIC | Age: 25
End: 2025-07-23
Payer: COMMERCIAL

## 2025-07-23 NOTE — TELEPHONE ENCOUNTER
Called and spoke to mother and patient regarding Pap smear results performed on 07/18/2025.  Patient was not completely cooperative with the examination, resulting in to be unsatisfactory for evaluation but did have notable inflammation.  This information was relayed to mother of patient inpatient we will need repeat Pap smear when she is amenable.  We will have my  call to reschedule and possibly give sedative due to patient's anxiety surrounding the procedure/pelvic exam prior to Pap smear being performed.      Yuli Maldonado, DO

## 2025-07-31 ENCOUNTER — TELEPHONE (OUTPATIENT)
Dept: FAMILY MEDICINE | Facility: CLINIC | Age: 25
End: 2025-07-31
Payer: COMMERCIAL

## 2025-07-31 DIAGNOSIS — F41.9 ANXIETY: Primary | ICD-10-CM

## 2025-07-31 RX ORDER — ALPRAZOLAM 0.25 MG/1
TABLET ORAL
Qty: 2 TABLET | Refills: 0 | Status: SHIPPED | OUTPATIENT
Start: 2025-07-31

## 2025-07-31 NOTE — TELEPHONE ENCOUNTER
Return patient's call; patient wanted to discuss results of Pap smear.  Patient is agreeable to trying oral mild sedation prior to repeating Pap smear in clinic.  We will order 0.25 Xanax to be brought to clinic to be taken prior to the Pap smear at that time; patient voices her understanding and agreement with this plan.  Will have  call patient to schedule in clinic.    Yuli Maldonado, DO

## 2025-08-13 ENCOUNTER — HOSPITAL ENCOUNTER (EMERGENCY)
Facility: HOSPITAL | Age: 25
Discharge: HOME | End: 2025-08-13
Attending: FAMILY MEDICINE
Payer: COMMERCIAL

## 2025-08-13 ENCOUNTER — OFFICE VISIT (OUTPATIENT)
Dept: FAMILY MEDICINE | Facility: CLINIC | Age: 25
End: 2025-08-13
Payer: COMMERCIAL

## 2025-08-13 VITALS
DIASTOLIC BLOOD PRESSURE: 63 MMHG | TEMPERATURE: 102 F | OXYGEN SATURATION: 99 % | WEIGHT: 154 LBS | HEIGHT: 67 IN | SYSTOLIC BLOOD PRESSURE: 100 MMHG | HEART RATE: 97 BPM | RESPIRATION RATE: 18 BRPM | BODY MASS INDEX: 24.17 KG/M2

## 2025-08-13 VITALS
RESPIRATION RATE: 18 BRPM | BODY MASS INDEX: 24.39 KG/M2 | OXYGEN SATURATION: 100 % | HEIGHT: 67 IN | DIASTOLIC BLOOD PRESSURE: 67 MMHG | WEIGHT: 155.38 LBS | SYSTOLIC BLOOD PRESSURE: 104 MMHG | HEART RATE: 78 BPM | TEMPERATURE: 98 F

## 2025-08-13 DIAGNOSIS — Z12.4 CERVICAL CANCER SCREENING: Primary | ICD-10-CM

## 2025-08-13 DIAGNOSIS — U07.1 COVID-19: Primary | ICD-10-CM

## 2025-08-13 DIAGNOSIS — H65.01 RIGHT ACUTE SEROUS OTITIS MEDIA, RECURRENCE NOT SPECIFIED: ICD-10-CM

## 2025-08-13 LAB
ALBUMIN SERPL-MCNC: 4.4 G/DL (ref 3.5–5)
ALBUMIN/GLOB SERPL: 1.2 RATIO (ref 1.1–2)
ALP SERPL-CCNC: 70 UNIT/L (ref 40–150)
ALT SERPL-CCNC: 13 UNIT/L (ref 0–55)
ANION GAP SERPL CALC-SCNC: 9 MEQ/L
AST SERPL-CCNC: 27 UNIT/L (ref 11–45)
B-HCG UR QL: NEGATIVE
BACTERIA #/AREA URNS AUTO: ABNORMAL /HPF
BASOPHILS # BLD AUTO: 0.03 X10(3)/MCL
BASOPHILS NFR BLD AUTO: 0.6 %
BILIRUB SERPL-MCNC: 0.5 MG/DL
BILIRUB UR QL STRIP.AUTO: NEGATIVE
BUN SERPL-MCNC: 7.5 MG/DL (ref 7–18.7)
CALCIUM SERPL-MCNC: 9.3 MG/DL (ref 8.4–10.2)
CHLORIDE SERPL-SCNC: 106 MMOL/L (ref 98–107)
CLARITY UR: CLEAR
CO2 SERPL-SCNC: 23 MMOL/L (ref 22–29)
COLOR UR AUTO: ABNORMAL
CREAT SERPL-MCNC: 1.17 MG/DL (ref 0.55–1.02)
CREAT/UREA NIT SERPL: 6
CTP QC/QA: YES
EOSINOPHIL # BLD AUTO: 0.09 X10(3)/MCL (ref 0–0.9)
EOSINOPHIL NFR BLD AUTO: 1.7 %
ERYTHROCYTE [DISTWIDTH] IN BLOOD BY AUTOMATED COUNT: 11.9 % (ref 11.5–17)
FLUAV AG UPPER RESP QL IA.RAPID: NOT DETECTED
FLUBV AG UPPER RESP QL IA.RAPID: NOT DETECTED
GFR SERPLBLD CREATININE-BSD FMLA CKD-EPI: >60 ML/MIN/1.73/M2
GLOBULIN SER-MCNC: 3.8 GM/DL (ref 2.4–3.5)
GLUCOSE SERPL-MCNC: 86 MG/DL (ref 74–100)
GLUCOSE UR QL STRIP: NORMAL
HCT VFR BLD AUTO: 32.3 % (ref 37–47)
HGB BLD-MCNC: 10.1 G/DL (ref 12–16)
HGB UR QL STRIP: ABNORMAL
HOLD SPECIMEN: NORMAL
HYALINE CASTS #/AREA URNS LPF: ABNORMAL /LPF
IMM GRANULOCYTES # BLD AUTO: 0.01 X10(3)/MCL (ref 0–0.04)
IMM GRANULOCYTES NFR BLD AUTO: 0.2 %
KETONES UR QL STRIP: NEGATIVE
LEUKOCYTE ESTERASE UR QL STRIP: 75
LYMPHOCYTES # BLD AUTO: 1.23 X10(3)/MCL (ref 0.6–4.6)
LYMPHOCYTES NFR BLD AUTO: 23.1 %
MCH RBC QN AUTO: 27.5 PG (ref 27–31)
MCHC RBC AUTO-ENTMCNC: 31.3 G/DL (ref 33–36)
MCV RBC AUTO: 88 FL (ref 80–94)
MONOCYTES # BLD AUTO: 0.71 X10(3)/MCL (ref 0.1–1.3)
MONOCYTES NFR BLD AUTO: 13.3 %
MUCOUS THREADS URNS QL MICRO: ABNORMAL /LPF
NEUTROPHILS # BLD AUTO: 3.26 X10(3)/MCL (ref 2.1–9.2)
NEUTROPHILS NFR BLD AUTO: 61.1 %
NITRITE UR QL STRIP: NEGATIVE
NRBC BLD AUTO-RTO: 0 %
PH UR STRIP: 5.5 [PH]
PLATELET # BLD AUTO: 165 X10(3)/MCL (ref 130–400)
PMV BLD AUTO: 11.2 FL (ref 7.4–10.4)
POTASSIUM SERPL-SCNC: 3.7 MMOL/L (ref 3.5–5.1)
PROT SERPL-MCNC: 8.2 GM/DL (ref 6.4–8.3)
PROT UR QL STRIP: NEGATIVE
RBC # BLD AUTO: 3.67 X10(6)/MCL (ref 4.2–5.4)
RBC #/AREA URNS AUTO: ABNORMAL /HPF
RSV A 5' UTR RNA NPH QL NAA+PROBE: NOT DETECTED
SARS-COV-2 RNA RESP QL NAA+PROBE: DETECTED
SODIUM SERPL-SCNC: 138 MMOL/L (ref 136–145)
SP GR UR STRIP.AUTO: 1.01 (ref 1–1.03)
SQUAMOUS #/AREA URNS LPF: ABNORMAL /HPF
STREP A PCR (OHS): NOT DETECTED
UROBILINOGEN UR STRIP-ACNC: NORMAL
WBC # BLD AUTO: 5.33 X10(3)/MCL (ref 4.5–11.5)
WBC #/AREA URNS AUTO: ABNORMAL /HPF

## 2025-08-13 PROCEDURE — 85025 COMPLETE CBC W/AUTO DIFF WBC: CPT | Performed by: PHYSICIAN ASSISTANT

## 2025-08-13 PROCEDURE — 88174 CYTOPATH C/V AUTO IN FLUID: CPT

## 2025-08-13 PROCEDURE — 99284 EMERGENCY DEPT VISIT MOD MDM: CPT | Mod: 27

## 2025-08-13 PROCEDURE — 81001 URINALYSIS AUTO W/SCOPE: CPT | Performed by: PHYSICIAN ASSISTANT

## 2025-08-13 PROCEDURE — 25000003 PHARM REV CODE 250: Performed by: PHYSICIAN ASSISTANT

## 2025-08-13 PROCEDURE — 81025 URINE PREGNANCY TEST: CPT | Performed by: PHYSICIAN ASSISTANT

## 2025-08-13 PROCEDURE — 99214 OFFICE O/P EST MOD 30 MIN: CPT | Mod: PBBFAC

## 2025-08-13 PROCEDURE — 87637 SARSCOV2&INF A&B&RSV AMP PRB: CPT | Performed by: PHYSICIAN ASSISTANT

## 2025-08-13 PROCEDURE — 80053 COMPREHEN METABOLIC PANEL: CPT | Performed by: PHYSICIAN ASSISTANT

## 2025-08-13 PROCEDURE — 87651 STREP A DNA AMP PROBE: CPT | Performed by: PHYSICIAN ASSISTANT

## 2025-08-13 RX ORDER — IBUPROFEN 400 MG/1
800 TABLET, FILM COATED ORAL
Status: COMPLETED | OUTPATIENT
Start: 2025-08-13 | End: 2025-08-13

## 2025-08-13 RX ORDER — ONDANSETRON 4 MG/1
4 TABLET, ORALLY DISINTEGRATING ORAL
Status: COMPLETED | OUTPATIENT
Start: 2025-08-13 | End: 2025-08-13

## 2025-08-13 RX ORDER — IBUPROFEN 800 MG/1
800 TABLET, FILM COATED ORAL 3 TIMES DAILY PRN
Qty: 30 TABLET | Refills: 0 | Status: SHIPPED | OUTPATIENT
Start: 2025-08-13

## 2025-08-13 RX ORDER — ACETAMINOPHEN 500 MG
1000 TABLET ORAL
Status: COMPLETED | OUTPATIENT
Start: 2025-08-13 | End: 2025-08-13

## 2025-08-13 RX ORDER — AMOXICILLIN AND CLAVULANATE POTASSIUM 875; 125 MG/1; MG/1
1 TABLET, FILM COATED ORAL EVERY 12 HOURS
Qty: 14 TABLET | Refills: 0 | Status: SHIPPED | OUTPATIENT
Start: 2025-08-13 | End: 2025-08-20

## 2025-08-13 RX ORDER — AMOXICILLIN AND CLAVULANATE POTASSIUM 875; 125 MG/1; MG/1
1 TABLET, FILM COATED ORAL
Status: COMPLETED | OUTPATIENT
Start: 2025-08-13 | End: 2025-08-13

## 2025-08-13 RX ORDER — ONDANSETRON 4 MG/1
4 TABLET, ORALLY DISINTEGRATING ORAL EVERY 8 HOURS PRN
Qty: 15 TABLET | Refills: 0 | Status: SHIPPED | OUTPATIENT
Start: 2025-08-13

## 2025-08-13 RX ADMIN — ACETAMINOPHEN 1000 MG: 500 TABLET ORAL at 07:08

## 2025-08-13 RX ADMIN — IBUPROFEN 800 MG: 400 TABLET ORAL at 08:08

## 2025-08-13 RX ADMIN — AMOXICILLIN AND CLAVULANATE POTASSIUM 1 TABLET: 875; 125 TABLET, FILM COATED ORAL at 08:08

## 2025-08-13 RX ADMIN — ONDANSETRON 4 MG: 4 TABLET, ORALLY DISINTEGRATING ORAL at 07:08

## 2025-08-15 LAB — PSYCHE PATHOLOGY RESULT: NORMAL
